# Patient Record
Sex: FEMALE | Race: WHITE
[De-identification: names, ages, dates, MRNs, and addresses within clinical notes are randomized per-mention and may not be internally consistent; named-entity substitution may affect disease eponyms.]

---

## 2017-07-02 ENCOUNTER — HOSPITAL ENCOUNTER (EMERGENCY)
Dept: HOSPITAL 45 - C.EDB | Age: 67
Discharge: HOME | End: 2017-07-02
Payer: COMMERCIAL

## 2017-07-02 VITALS
BODY MASS INDEX: 36.6 KG/M2 | BODY MASS INDEX: 36.6 KG/M2 | WEIGHT: 214.4 LBS | HEIGHT: 64.02 IN | WEIGHT: 214.4 LBS | HEIGHT: 64.02 IN

## 2017-07-02 VITALS — SYSTOLIC BLOOD PRESSURE: 124 MMHG | DIASTOLIC BLOOD PRESSURE: 83 MMHG | HEART RATE: 68 BPM | OXYGEN SATURATION: 91 %

## 2017-07-02 VITALS — TEMPERATURE: 98.42 F

## 2017-07-02 DIAGNOSIS — K21.9: ICD-10-CM

## 2017-07-02 DIAGNOSIS — E78.00: ICD-10-CM

## 2017-07-02 DIAGNOSIS — Z79.899: ICD-10-CM

## 2017-07-02 DIAGNOSIS — S82.852A: Primary | ICD-10-CM

## 2017-07-02 DIAGNOSIS — M19.90: ICD-10-CM

## 2017-07-02 DIAGNOSIS — E78.5: ICD-10-CM

## 2017-07-02 DIAGNOSIS — I10: ICD-10-CM

## 2017-07-02 DIAGNOSIS — E03.9: ICD-10-CM
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
## 2017-07-02 DIAGNOSIS — Z79.82: ICD-10-CM

## 2017-07-02 DIAGNOSIS — J32.9: ICD-10-CM

## 2017-07-02 LAB
ANION GAP SERPL CALC-SCNC: 9 MMOL/L (ref 3–11)
BASOPHILS # BLD: 0.04 K/UL (ref 0–0.2)
BASOPHILS NFR BLD: 0.7 %
BUN SERPL-MCNC: 23 MG/DL (ref 7–18)
BUN/CREAT SERPL: 22.7 (ref 10–20)
CALCIUM SERPL-MCNC: 10.4 MG/DL (ref 8.5–10.1)
CHLORIDE SERPL-SCNC: 106 MMOL/L (ref 98–107)
CO2 SERPL-SCNC: 26 MMOL/L (ref 21–32)
COMPLETE: YES
CREAT CL PREDICTED SERPL C-G-VRATE: 62.7 ML/MIN
CREAT SERPL-MCNC: 1 MG/DL (ref 0.6–1.2)
EOSINOPHIL NFR BLD AUTO: 216 K/UL (ref 130–400)
GLUCOSE SERPL-MCNC: 94 MG/DL (ref 70–99)
HCT VFR BLD CALC: 44.2 % (ref 37–47)
IG%: 0.2 %
IMM GRANULOCYTES NFR BLD AUTO: 17.6 %
LYMPHOCYTES # BLD: 1.04 K/UL (ref 1.2–3.4)
MCH RBC QN AUTO: 30.2 PG (ref 25–34)
MCHC RBC AUTO-ENTMCNC: 33.7 G/DL (ref 32–36)
MCV RBC AUTO: 89.7 FL (ref 80–100)
MONOCYTES NFR BLD: 10.3 %
NEUTROPHILS # BLD AUTO: 1.9 %
NEUTROPHILS NFR BLD AUTO: 69.3 %
PMV BLD AUTO: 10.8 FL (ref 7.4–10.4)
POTASSIUM SERPL-SCNC: 4 MMOL/L (ref 3.5–5.1)
RBC # BLD AUTO: 4.93 M/UL (ref 4.2–5.4)
SODIUM SERPL-SCNC: 141 MMOL/L (ref 136–145)
WBC # BLD AUTO: 5.9 K/UL (ref 4.8–10.8)

## 2017-07-02 NOTE — DIAGNOSTIC IMAGING REPORT
LEFT ANKLE 3 VIEWS



CLINICAL HISTORY: Postreduction examination. Ankle fracture.



FINDINGS: 3 views of the left ankle are compared to study performed earlier the

same day 7/2/2017. The examination is performed through a splint, obscuring fine

bony detail. The skeletal structures are osteopenic. Again seen is a distracted

avulsion fracture of the medial malleolus and a minimally distracted fracture

distal fibula. A vertically oriented fracture is again seen through the

posterior tibial plafond, and there is has been modest improvement in alignment

status post reduction. There is approximately 1.4 cm of persistent posterior

dislocation of the talus at the tibiotalar joint. A large joint effusion is

identified and significant soft tissue edema is present around ankle. There is a

large dorsal calcaneal enthesophyte. Degenerative spurring is noted from the

anterior tibial plafond. Degenerative spurring is also seen along the dorsal

aspect of the tarsal bones.



IMPRESSION: Modest improvement in alignment of a trimalleolar left ankle

fracture status post external reduction.







Electronically signed by:  Garrick Bettencourt M.D.

7/2/2017 11:59 AM



Dictated Date/Time:  7/2/2017 11:57 AM

## 2017-07-02 NOTE — DIAGNOSTIC IMAGING REPORT
LEFT ANKLE 3 VIEWS



CLINICAL HISTORY: Postreduction examination. Left ankle fractures.



FINDINGS: AP and crosstable lateral portable views of the left ankle are

compared to studies performed earlier the same day 7/2/2017. The skeletal

structures are osteopenic. Again seen is a trimalleolar fracture of the left

ankle, with an avulsion fracture of the medial malleolus an oblique fracture

through the distal fibula. Again seen is a vertically oriented and comminuted

fracture through the posterior tibial plafond. There is approximately 9 mm of

posterior distraction of the largest fragments. There has been restoration of

near-anatomic alignment at the tibiotalar joint as compared to previous. A large

joint effusion is identified and significant soft tissue edema is present around

ankle. There is a large dorsal calcaneal enthesophyte. Degenerative spurring is

noted from the anterior tibial plafond. Degenerative spurring is also seen along

the dorsal aspect of the tarsal bones.



IMPRESSION: Again seen is a trimalleolar left ankle fracture. There has been

significant improvement in alignment as compared to previous with restoration of

near-anatomic alignment at the tibiotalar joint.







Electronically signed by:  Garrick Bettencourt M.D.

7/2/2017 1:03 PM



Dictated Date/Time:  7/2/2017 1:00 PM

## 2017-07-02 NOTE — DIAGNOSTIC IMAGING REPORT
LEFT ANKLE 3 VIEWS



CLINICAL HISTORY: Left ankle pain.



FINDINGS: 3 views of the left ankle are obtained. No prior studies are available

for comparison at the time of dictation. The skeletal structures are osteopenic.

There is a distracted avulsion fracture of the medial malleolus. A vertically

oriented fracture is seen through the posterior tibial plafond, and there is

approximately 2 cm of posterior dislocation of the talus at the tibiotalar

joint. There is an oblique and distracted fracture of the distal fibula. A large

joint effusion is identified and significant soft tissue edema is present around

ankle. There is a large dorsal calcaneal enthesophyte. Degenerative spurring is

noted from the anterior tibial plafond. Degenerative spurring is also seen along

the dorsal aspect of the tarsal bones.



IMPRESSION: Trimalleolar left ankle fracture and dislocation as above with

associated joint effusion and soft tissue edema.







Electronically signed by:  Garrick Bettencourt M.D.

7/2/2017 10:40 AM



Dictated Date/Time:  7/2/2017 10:37 AM

## 2017-07-06 ENCOUNTER — HOSPITAL ENCOUNTER (OUTPATIENT)
Dept: HOSPITAL 45 - C.CPL | Age: 67
Discharge: HOME | End: 2017-07-06
Attending: PHYSICIAN ASSISTANT
Payer: COMMERCIAL
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
## 2017-07-06 DIAGNOSIS — S82.852A: Primary | ICD-10-CM

## 2017-07-06 DIAGNOSIS — Z01.818: ICD-10-CM

## 2017-07-06 NOTE — DIAGNOSTIC IMAGING REPORT
CT LEFT ANKLE NO CONTRAST



CT DOSE:    



CLINICAL HISTORY: S82.899A Z01.818 trimalleolar fracture dislocation



TECHNIQUE: Helical images were acquired in the transverse plane. Sagittal and

coronal reformatted images were acquired.



COMPARISON STUDY:  Conventional radiographic study dated 7/2/2017



FINDINGS: There is diffuse soft tissue edema. There is a comminuted oblique

fracture of the distal fibula. The distal fragment is posteriorly displaced x 10

mm.



There is a comminuted fracture of the posterior malleolus of the distal tibia.

The major component of the tibia is anterior subluxed with respect to the talus

x 11 mm.



There is a comminuted fracture of the medial malleolus. Major fragments are

distracted x 6 mm.



There is diffuse soft tissue edema.



No tendon or fractures are visualized. The subtalar joint appears intact.



There is a prominent Achilles insertional spur.



IMPRESSION:  Trimalleolar fracture subluxation. 







Electronically signed by:  Dex Mccormick M.D.

7/6/2017 4:09 PM



Dictated Date/Time:  7/6/2017 4:04 PM

## 2017-07-10 ENCOUNTER — HOSPITAL ENCOUNTER (OUTPATIENT)
Dept: HOSPITAL 45 - X.SURG | Age: 67
Discharge: HOME | End: 2017-07-10
Attending: ORTHOPAEDIC SURGERY
Payer: COMMERCIAL

## 2017-07-10 VITALS
WEIGHT: 214.44 LBS | HEIGHT: 64.02 IN | HEIGHT: 64.02 IN | WEIGHT: 214.44 LBS | BODY MASS INDEX: 36.61 KG/M2 | BODY MASS INDEX: 36.61 KG/M2

## 2017-07-10 VITALS — DIASTOLIC BLOOD PRESSURE: 85 MMHG | OXYGEN SATURATION: 94 % | SYSTOLIC BLOOD PRESSURE: 121 MMHG | HEART RATE: 83 BPM

## 2017-07-10 VITALS — TEMPERATURE: 97.7 F

## 2017-07-10 DIAGNOSIS — Z90.710: ICD-10-CM

## 2017-07-10 DIAGNOSIS — Z90.89: ICD-10-CM

## 2017-07-10 DIAGNOSIS — M19.90: ICD-10-CM

## 2017-07-10 DIAGNOSIS — Z90.49: ICD-10-CM

## 2017-07-10 DIAGNOSIS — S82.852A: Primary | ICD-10-CM

## 2017-07-10 DIAGNOSIS — E03.9: ICD-10-CM

## 2017-07-10 DIAGNOSIS — M06.9: ICD-10-CM

## 2017-07-10 DIAGNOSIS — E78.5: ICD-10-CM

## 2017-07-10 DIAGNOSIS — Z79.82: ICD-10-CM
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
## 2017-07-10 DIAGNOSIS — E66.9: ICD-10-CM

## 2017-07-10 DIAGNOSIS — I10: ICD-10-CM

## 2017-07-10 DIAGNOSIS — K21.9: ICD-10-CM

## 2017-07-10 NOTE — [facility] OPERATIVE REPORT
Operative Report


Operative Date


Jul 10, 2017.





Pre-Operative Diagnosis





Left Ankle Trimalleolar Fracture





Post-Operative Diagnosis





Same





Procedure(s) Performed





Left Trimalleolar Ankle Fracture Open Reduction Internal Fixation





Surgeon


Dr. TACHO Paez





Assistant Surgeon(s)


BRADEN Stone PA-C (No fellow avail)





Estimated Blood Loss


25 ML





Findings


Comminuted, displaced trimalleolar left ankle fracture, with soft bone.





Fluids (cc crystalloids)


1800





Specimens





None





Drains


n/a





Anesthesia


LMA + Popliteal block





Complication(s)


None





Disposition


Recovery Room / PACU (Stable)





Implants


Lateral Mal:


1) 5-Hole Distal Fibular Locking Plate (Arthrex).


2) 2.7 mm Locking Screws (12, 14, 16, & 18 mm).


3) 3.5 mm Locking Screws (14 mm x 3).


4) 3.5 mm Cortical Screw (22 mm).





Posterior Mal:


1) 4.0 mm Cannulated Screws (50 x 2 mm).





Medial Mal:


1) 3-Hole Medial Hook Plate (Arthrex).


2) 4.0 mm Cannulated Screw (44 mm).


3) 3.5 mm Cortical Screw (34 mm).


4) 3.5 mm Locking (24 mm).





Indications


The patient is a 67 year old female who injured their left ankle after a fall, 

sustaining a displaced bimalleolar ankle fracture.  The patient understands the 

risks of surgery, which include but are not limited to: bleeding, infection, re-

operation, damage to nerves and arteries, continued pain, loss of reduction, 

hardware failure, the need for repeat surgery, decrease level of activity, and 

DVT.  The patient understand all of these instructions and explanations, all of 

their questions have been satisfactorily addressed.  The patient have elected 

to proceed with surgery and the informed consent was signed.





Description of Procedure


The patient was taken to the Operating Room and placed in the supine position 

on the operating table.  After general anesthetic was administered a 

multidisciplinary time-out was performed identifying my initials on the left 

limb as the correct and operative limb.  Prior to the incision being made, 600 

milligrams of intravenous Ancef were given.  The left leg was prepped and 

draped in the standard fashion.





The distal fibula was marked as well as the planned incision centered about the 

distal fibula 8 cm in length.  In addition the planned medial incision 

approximately 5 cm in length and 3 cm anterior incision was also marked.  The 

planned incisions were injected with a 50:50 mixture of 1% lidocaine and 0.5 % 

Marcaine with epi for a total of 16 cc.  





The fibula was approached first.  The planned incision was made and carried 

down to the fibula.  The fracture site was easily identified as there was a 

tear in the fascia.  The Superficial Peroneal nerve was identified 

approximately 7cm proximal to the tip of fibula and was dissected and protected 

throughout the case.  The fracture site was debrided with copious irrigation, 

dental pick, and rongeur, removing any soft tissue and hematoma.  Using a a 

pointed reduction clamp the fracture was reduced.  A single lag screw was 

placed in the standard fashion and the clamp was able to be removed.  Her bone 

was soft.  The posterior malleolar fragment had improved alignment.  I was 

unable to place a clamp to further assist in the reduction without having the 5-

hole distal fibular locking plate fit the distal fibula and held in place by K 

wires through locking guides.  3 proximal locking screws were placed through 

the plate and locking screws were placed in the distal holes.   





Next our attention was drawn to the posterior malleolar fracture and with the 

distal fibula reduced a pointed reduction clamp was placed along the posterior 

malleolar fragment and a small anterior skin incision was made and care was 

taken to protect any neurovascular structures for the clamp to further aid in 

the reduction of the posterior malleolus.  The incision needed to be extended 

slightly in order to place the guidewire for the cannulated screw.  After the 

first screw had been placed a second guidewire was placed with a parallel 

aiming guide slightly distal and more lateral.  Both screws were placed in the 

standard fashion with noted improved alignment of the posterior comminuted 

malleolus fracture.  





Finally our attention was drawn to the medial malleoli or fracture.  The 

initial incision needed to be extended proximally after evaluating the 

comminuted medial malleoli or fracture and determining that to cannulated 

screws would not be able to be placed.  The fracture site was debrided with 

copious irrigation, dental pick, and rongeur, removing any soft tissue and 

hematoma.  Using a a pointed reduction clamp the sleeve of posterior fracture 

fragment was reduced and used to help guide the reduction of the tip of the 

medial malleolus.  The tip of the medial malleolus was held reduced with dental 

pick and a guidewire was placed.  A hook plate was desired to maintain the 

reduction, but in order to achieve good compression at the fracture site the 

cannulated screw was placed to suck the plate more proximally.  Again her bone 

was soft.  A nonlocking screw was placed proximally to suck the plate down to 

the bone followed by another locking screw proximally.


The wounds were copiously irrigated.  Final x-rays were obtained, showing near 

anatomic reduction of the comminuted trimalleolar ankle fracture.  The fascia 

over the plates was closed with 2-0 Vicryl and the subcutaneous layer were 

closed with 3-0 Vicryl. The skin was closed with staples. The sponge and needle 

counts were correct.  The wounds were covered with Xeroform, 4x4's, ABD's, 

Steril cast padding, and an AO splint was placed.  The patient was awakened and 

taken to the recovery room in stable condition.





Post-op Instructions:


The patient will remain [facility] for 4-6 weeks. Pain medicine prescription was given 

pre-operatively to be taken as needed.  The patient will follow up with me in 10

-15 days.


I attest to the content of the Intraoperative Record and any orders documented 

therein.  Any exceptions are noted below.

## 2017-07-10 NOTE — DISCHARGE INSTRUCTIONS-[facility]
Discharge Instructions


Date of Service


Jul 10, 2017.





Visit


Reason for Visit:  Left Ankle Fracture





Discharge


Discharge Diagnosis / Problem:  Status post ORIF left ankle fracture





Discharge Goals


Goal(s):  Decrease discomfort, Improve function, Increase independence





Activity Recommendations


Activity Limitations:  per Instructions/Follow-up section


Lifting Limitations:  none


Exercise/Sports Limitations:  none


May Resume Sexual Activity:  when tolerated


Shower/Bathe:  may shower/bathe in 3 days


Driving or Machine Use:  Not while on narcotics or splinted/boot





Anesthesia


.





Post Anesthesia Instructions:





If you have had General Anesthesia or IV Sedation:





*  Do not drive today.


*  Resume driving when surgeon permits.


*  Do not make important decisions or sign legal documents today.


*  Call surgeon for:





   1.  Temperature elevations greater than 101 degrees F.


   2.  Uncontrollable pain.


   3.  Excessive bleeding.


   4.  Persistent nausea and vomiting.


   5.  Medication intolerance (nausea, vomiting or rash).





*  For nausea and vomiting use only clear liquids such as: tea, soda, bouillon 

until nausea subsides, then gradually increase diet as tolerated.





*  If you have any concerns or questions, call your surgeon's office.  If 

physician is unavailable and it is an emergency, call 911 or go to the nearest 

emergency room.





.





Instructions / Follow-Up


Instructions / Follow-Up


Dr. Paez in 10-15 days.


Gonzalez Stone within 1 week.





Diet Recommendations


Home Diet:  resume previous diet





Procedures


Procedures Performed:  


Left Ankle Fracture Open Reduction Internal Fixation





Pending Studies


Studies pending at discharge:  no





Medical Emergencies








.


Who to Call and When:





Medical Emergencies:  If at any time you feel your situation is an emergency, 

please call 911 immediately.





.





Non-Emergent Contact


Non-Emergency issues call your:  Surgeon


Call Non-Emergent contact if:  temperature is above 101.5, your pain is not 

controlled, wound has increased drainage, wound has increased redness





.


.








"Provider Documentation" section prepared by Kimani Paez.








.

## 2017-07-10 NOTE — ANESTHESIA PROGRESS NT - [facility]
Anesthesia Post Op Note


Date & Time


Jul 10, 2017 at 17:13





Vital Signs


Pain Intensity:  0





Vital Signs Past 12 Hours








  Date Time  Temp Pulse Resp B/P (MAP) Pulse Ox O2 Delivery O2 Flow Rate FiO2


 


7/10/17 16:59 36.5 85 16 119/64 (82) 94 Room Air  


 


7/10/17 16:56 36.5   156/98    


 


7/10/17 16:53  86 17  92   


 


7/10/17 16:53  86 17     


 


7/10/17 16:51    123/81    


 


7/10/17 16:48  88 17  90   


 


7/10/17 16:48  88 17     


 


7/10/17 16:46    152/89    


 


7/10/17 16:43  87 18     


 


7/10/17 16:43  87 18  94   


 


7/10/17 16:41    138/87    


 


7/10/17 16:38  86 17  96   


 


7/10/17 16:38  87 17     


 


7/10/17 16:36    121/79    


 


7/10/17 16:33  88 20     


 


7/10/17 16:33  88 20  98   


 


7/10/17 16:31    127/80    


 


7/10/17 16:28  90 23     


 


7/10/17 16:28  90 23  97   


 


7/10/17 16:26    147/84    


 


7/10/17 16:23  93      


 


7/10/17 16:23  93   95   


 


7/10/17 16:21    136/78    


 


7/10/17 16:20    130/79    


 


7/10/17 16:19 36.2 89 20 136/78 97 Mask 6 


 


7/10/17 12:58   0     


 


7/10/17 12:57   15     


 


7/10/17 12:56    145/84    


 


7/10/17 12:52  72 25  100   


 


7/10/17 12:52  73      


 


7/10/17 12:51    162/94    


 


7/10/17 12:49  70 32  99   


 


7/10/17 12:49  70      


 


7/10/17 12:46    141/86    


 


7/10/17 10:46 36.9 68 20 146/85 (105) 92 Room Air  











Notes


Mental Status:  alert / awake / arousable, participated in evaluation


Pt Amnestic to Procedure:  Yes


Nausea / Vomiting:  adequately controlled


Pain:  adequately controlled


Airway Patency, RR, SpO2:  stable & adequate


BP & HR:  stable & adequate


Hydration State:  stable & adequate


Anesthetic Complications:  no major complications apparent

## 2017-07-10 NOTE — [facility] OPERATIVE REPORT
Operative Report


Operative Date


Jul 10, 2017.





Pre-Operative Diagnosis





Left Ankle Trimalleolar Fracture





Post-Operative Diagnosis





Same





Procedure(s) Performed





Left Trimalleolar Ankle Fracture Open Reduction Internal Fixation





Surgeon


Dr. TACHO Paez





Assistant Surgeon(s)


BRADEN Stone PA-C (No fellow avail)





Estimated Blood Loss


25 ML





Findings


same





Fluids (cc crystalloids)


1800





Specimens





None





Drains


none





Anesthesia


general, block





Complication(s)


None





Disposition


Recovery Room / PACU





Implants


see Dr. Paez's note





Indications


sustained injury to left ankle, xrays obtained, surgery recommended, consents 

signed





Description of Procedure


taken to the OR, prepped and draped, I was present the entire case, please see 

Dr. Paez's op note for further detail.


I attest to the content of the Intraoperative Record and any orders documented 

therein.  Any exceptions are noted below.

## 2017-07-10 NOTE — [facility] POST OPERATIVE BRIEF NOTE
Immediate Operative Summary


Operative Date


Jul 10, 2017.





Pre-Operative Diagnosis





Left Ankle Fracture





Post-Operative Diagnosis





Same





Procedure(s) Performed





Left Ankle Fracture Open Reduction Internal Fixation





Surgeon


Dr. TACHO Paez





Assistant Surgeon(s)


BRADEN Stone PA-C (No fellow avail)





Estimated Blood Loss


25 ML





Findings


Comminuted, displaced trimalleolar left ankle fracture, with soft bone.





Fluids (cc crystalloids)


1800





Specimens





None





Drains


n/a





Anesthesia


LMA + popliteal block





Complication(s)


None





Disposition


Recovery Room / PACU (Stable)

## 2017-07-18 ENCOUNTER — HOSPITAL ENCOUNTER (INPATIENT)
Dept: HOSPITAL 45 - C.EDC | Age: 67
LOS: 6 days | Discharge: HOME | DRG: 175 | End: 2017-07-24
Attending: HOSPITALIST | Admitting: HOSPITALIST
Payer: COMMERCIAL

## 2017-07-18 VITALS
WEIGHT: 215.61 LBS | HEIGHT: 64.5 IN | HEIGHT: 64.5 IN | WEIGHT: 215.61 LBS | BODY MASS INDEX: 36.36 KG/M2 | BODY MASS INDEX: 36.36 KG/M2

## 2017-07-18 DIAGNOSIS — I82.492: ICD-10-CM

## 2017-07-18 DIAGNOSIS — Z98.890: ICD-10-CM

## 2017-07-18 DIAGNOSIS — E03.9: ICD-10-CM

## 2017-07-18 DIAGNOSIS — E78.5: ICD-10-CM

## 2017-07-18 DIAGNOSIS — Z79.899: ICD-10-CM

## 2017-07-18 DIAGNOSIS — I10: ICD-10-CM

## 2017-07-18 DIAGNOSIS — J96.01: ICD-10-CM

## 2017-07-18 DIAGNOSIS — Z79.82: ICD-10-CM

## 2017-07-18 DIAGNOSIS — K21.9: ICD-10-CM

## 2017-07-18 DIAGNOSIS — F17.200: ICD-10-CM

## 2017-07-18 DIAGNOSIS — I82.432: ICD-10-CM

## 2017-07-18 DIAGNOSIS — I26.99: Primary | ICD-10-CM

## 2017-07-19 VITALS
OXYGEN SATURATION: 96 % | SYSTOLIC BLOOD PRESSURE: 118 MMHG | TEMPERATURE: 97.7 F | DIASTOLIC BLOOD PRESSURE: 73 MMHG | HEART RATE: 57 BPM

## 2017-07-19 VITALS
SYSTOLIC BLOOD PRESSURE: 129 MMHG | OXYGEN SATURATION: 92 % | TEMPERATURE: 97.88 F | DIASTOLIC BLOOD PRESSURE: 78 MMHG | HEART RATE: 64 BPM

## 2017-07-19 VITALS
DIASTOLIC BLOOD PRESSURE: 78 MMHG | OXYGEN SATURATION: 94 % | HEART RATE: 87 BPM | SYSTOLIC BLOOD PRESSURE: 157 MMHG | TEMPERATURE: 97.7 F

## 2017-07-19 VITALS
HEART RATE: 64 BPM | DIASTOLIC BLOOD PRESSURE: 63 MMHG | OXYGEN SATURATION: 94 % | SYSTOLIC BLOOD PRESSURE: 121 MMHG | TEMPERATURE: 98.06 F

## 2017-07-19 VITALS
HEART RATE: 58 BPM | DIASTOLIC BLOOD PRESSURE: 74 MMHG | SYSTOLIC BLOOD PRESSURE: 124 MMHG | OXYGEN SATURATION: 94 % | TEMPERATURE: 98.06 F

## 2017-07-19 VITALS — OXYGEN SATURATION: 92 %

## 2017-07-19 VITALS
SYSTOLIC BLOOD PRESSURE: 122 MMHG | HEART RATE: 64 BPM | OXYGEN SATURATION: 94 % | DIASTOLIC BLOOD PRESSURE: 76 MMHG | TEMPERATURE: 98.6 F

## 2017-07-19 LAB
ALP SERPL-CCNC: 68 U/L (ref 45–117)
ALT SERPL-CCNC: 31 U/L (ref 12–78)
ANION GAP SERPL CALC-SCNC: 7 MMOL/L (ref 3–11)
ANION GAP SERPL CALC-SCNC: 8 MMOL/L (ref 3–11)
ARTERIAL PATENCY WRIST A: (no result)
AST SERPL-CCNC: 24 U/L (ref 15–37)
BASE EXCESS BLDA CALC-SCNC: -2.7 MEQ/L (ref -9–1.8)
BASOPHILS # BLD: 0.02 K/UL (ref 0–0.2)
BASOPHILS # BLD: 0.04 K/UL (ref 0–0.2)
BASOPHILS NFR BLD: 0.3 %
BASOPHILS NFR BLD: 0.6 %
BUN SERPL-MCNC: 21 MG/DL (ref 7–18)
BUN SERPL-MCNC: 22 MG/DL (ref 7–18)
BUN/CREAT SERPL: 17.1 (ref 10–20)
BUN/CREAT SERPL: 18.3 (ref 10–20)
CALCIUM SERPL-MCNC: 10.5 MG/DL (ref 8.5–10.1)
CALCIUM SERPL-MCNC: 9 MG/DL (ref 8.5–10.1)
CHLORIDE SERPL-SCNC: 108 MMOL/L (ref 98–107)
CHLORIDE SERPL-SCNC: 109 MMOL/L (ref 98–107)
CKMB/CK RATIO: (no result) (ref 0–3)
CO2 SERPL-SCNC: 27 MMOL/L (ref 21–32)
CO2 SERPL-SCNC: 27 MMOL/L (ref 21–32)
COMPLETE: YES
COMPLETE: YES
CREAT CL PREDICTED SERPL C-G-VRATE: 53.1 ML/MIN
CREAT CL PREDICTED SERPL C-G-VRATE: 53.3 ML/MIN
CREAT SERPL-MCNC: 1.2 MG/DL (ref 0.6–1.2)
CREAT SERPL-MCNC: 1.2 MG/DL (ref 0.6–1.2)
EOSINOPHIL NFR BLD AUTO: 276 K/UL (ref 130–400)
EOSINOPHIL NFR BLD AUTO: 315 K/UL (ref 130–400)
GLUCOSE SERPL-MCNC: 102 MG/DL (ref 70–99)
GLUCOSE SERPL-MCNC: 103 MG/DL (ref 70–99)
HCO3 BLDA-SCNC: 21 MMOL/L (ref 19–24)
HCT VFR BLD CALC: 38 % (ref 37–47)
HCT VFR BLD CALC: 42.6 % (ref 37–47)
IG%: 0.3 %
IG%: 0.4 %
IMM GRANULOCYTES NFR BLD AUTO: 17.2 %
IMM GRANULOCYTES NFR BLD AUTO: 19.9 %
INR PPP: 1 (ref 0.9–1.1)
LYMPHOCYTES # BLD: 1.35 K/UL (ref 1.2–3.4)
LYMPHOCYTES # BLD: 1.44 K/UL (ref 1.2–3.4)
MCH RBC QN AUTO: 28.5 PG (ref 25–34)
MCH RBC QN AUTO: 29.5 PG (ref 25–34)
MCHC RBC AUTO-ENTMCNC: 31.6 G/DL (ref 32–36)
MCHC RBC AUTO-ENTMCNC: 33.3 G/DL (ref 32–36)
MCV RBC AUTO: 88.6 FL (ref 80–100)
MCV RBC AUTO: 90.3 FL (ref 80–100)
MONOCYTES NFR BLD: 8 %
MONOCYTES NFR BLD: 8.7 %
NEUTROPHILS # BLD AUTO: 2.1 %
NEUTROPHILS # BLD AUTO: 3.6 %
NEUTROPHILS NFR BLD AUTO: 68.3 %
NEUTROPHILS NFR BLD AUTO: 70.6 %
O2 ADMINISTRATION: (no result)
PARTIAL THROMBOPLASTIN RATIO: 0.9
PARTIAL THROMBOPLASTIN RATIO: 1.9
PMV BLD AUTO: 10.6 FL (ref 7.4–10.4)
PMV BLD AUTO: 10.7 FL (ref 7.4–10.4)
PO2 BLDA: 71 MM/HG (ref 80–95)
POTASSIUM SERPL-SCNC: 3.7 MMOL/L (ref 3.5–5.1)
POTASSIUM SERPL-SCNC: 4.2 MMOL/L (ref 3.5–5.1)
PROTHROMBIN TIME: 11 SECONDS (ref 9–12)
RBC # BLD AUTO: 4.21 M/UL (ref 4.2–5.4)
RBC # BLD AUTO: 4.81 M/UL (ref 4.2–5.4)
SAO2 % BLDA: 93.5 % (ref 90–95)
SODIUM SERPL-SCNC: 143 MMOL/L (ref 136–145)
SODIUM SERPL-SCNC: 143 MMOL/L (ref 136–145)
WBC # BLD AUTO: 7.22 K/UL (ref 4.8–10.8)
WBC # BLD AUTO: 7.84 K/UL (ref 4.8–10.8)

## 2017-07-19 RX ADMIN — RANITIDINE SCH MG: 150 TABLET ORAL at 08:09

## 2017-07-19 RX ADMIN — HEPARIN SODIUM PRN MLS/HR: 1000 INJECTION, SOLUTION INTRAVENOUS; SUBCUTANEOUS at 21:45

## 2017-07-19 RX ADMIN — LEVOTHYROXINE SODIUM SCH MCG: 112 TABLET ORAL at 08:09

## 2017-07-19 RX ADMIN — ACETAMINOPHEN PRN MG: 325 TABLET ORAL at 06:40

## 2017-07-19 RX ADMIN — FENOFIBRATE SCH MG: 145 TABLET, FILM COATED ORAL at 08:09

## 2017-07-19 RX ADMIN — ACETAMINOPHEN PRN MG: 325 TABLET ORAL at 14:03

## 2017-07-19 RX ADMIN — RANITIDINE SCH MG: 150 TABLET ORAL at 19:28

## 2017-07-19 RX ADMIN — ACETAMINOPHEN PRN MG: 325 TABLET ORAL at 19:28

## 2017-07-19 RX ADMIN — HEPARIN SODIUM PRN MLS/HR: 1000 INJECTION, SOLUTION INTRAVENOUS; SUBCUTANEOUS at 03:36

## 2017-07-19 NOTE — DIAGNOSTIC IMAGING REPORT
CHEST ONE VIEW PORTABLE



CLINICAL HISTORY: 67 years-old Female presenting with CHEST PAIN. 



TECHNIQUE: Portable upright AP view of the chest was obtained.



COMPARISON:  CT performed the same day and CT from 2012.



FINDINGS:

Cardiomediastinal silhouette normal. Minimal left apical opacity is better

appreciated on subsequent CT. Pleural spaces clear. Osseous structures and upper

abdomen normal.



IMPRESSION:

1.  Minimal left apical opacity better appreciated on subsequent CT.







Electronically signed by:  Mitchell Bradshaw M.D.

7/19/2017 7:02 AM



Dictated Date/Time:  7/19/2017 7:00 AM

## 2017-07-19 NOTE — EMERGENCY ROOM VISIT NOTE
History


Report prepared by Rashid:  Hermila Ortega


Under the Supervision of:  Dr. Dequan Edwards M.D.


First contact with patient:  23:51


Chief Complaint:  CHEST PAIN


Stated Complaint:  CHEST PAIN


Nursing Triage Summary:  


Pt arrived via ambulance ALS.  Pt stated to EMS that she started to experience 


chest tightness and shortness of breath around noon today. The tightness is 


substernal and wraps around her chest to back.  Pt thought that she might have 


pulled a muscle so she used bengay on the area.  Pt stated that she went to bed 


and her tightness in the chest got worse.  Pt stated she became diaphoretic.  

Pt 


denies nausea.  Pt had recent surgery on her left ankle.  When EMS arrived the 


pt pulse ox was low 90's.  EMS gave the pt 324mg Aspirin and 2 Nitro.  The pts 


pain was originally 6 out of 10.  After the second nitro was given it came down 


to a 3 out of 10.





History of Present Illness


The patient is a 67 year old female who presents to the Emergency Room with 

complaints of worsening chest pain that began today.  She currently rates her 

discomfort as a 3/10 in severity.  The patient states that she just recently 

had surgery and has not been moving as frequently.  She states that she has 

been placed on aspirin twice per day since her surgery.  The patient states 

that her pain wraps around to her back.  She additionally notes that she has 

become short of breath with the pain.  The patient reports a previous stress 

test, but denies any previous cardiac catheterization or ever seeing a 

cardiologist in the back.  She states that the pain has progressively worsened 

since noon.  The patient describes her pain as a tightness and pressure.





   Source of History:  patient


   Onset:  today


   Position:  chest


   Symptom Intensity:  3/10


   Quality:  pressure, other (tightness)


   Timing:  worsening


   Associated Symptoms:  + SOB, + back pain





Review of Systems


See HPI for pertinent positives & negatives. A total of 10 systems reviewed and 

were otherwise negative.





Past Medical & Surgical


Medical Problems:


(1) Benign hypertension


(2) Chronic Sinusitis Nos


(3) Esophageal Reflux


(4) Gastroesophageal reflux disease


(5) Hyperlipidemia Nec/Nos


(6) Hypertension Nos


(7) Hypothyroidism


(8) Respiratory failure, acute








Family History





Diabetes mellitus


Gallbladder disease





Social History


Smoking Status:  Never Smoker


Marital Status:  


Housing Status:  lives with significant other


Occupation Status:  employed





Current/Historical Medications


Scheduled


Aspirin Enteric Coated (Ecotrin Or Generic), 325 MG PO BID


Atenolol (Tenormin), 50 MG PO QAM


Calcium Carbonate-Vitamin D (Calcium 600+D3), 1 TAB PO BID


Fenofibrate (Tricor), 160 MG PO QAM


Fish Oil (Pontiac-3), 1 CAP PO BID


Hydroxyzine Hcl (Atarax), 25 MG PO QAM


Levothyroxine Sodium (Levothyroxine Sodium), 112 MCG PO QAM


Ranitidine (Zantac), 150 MG PO BID





Scheduled PRN


Acetaminophen (Tylenol), 1,000 MG PO Q6H PRN for Pain


Hydrocodone-Acetaminophen (Lortab 5-325 mg), 1 TAB PO Q4H PRN for Pain





Allergies


Coded Allergies:  


     Amoxicillin (Verified  Allergy, Intermediate, DIARRHEA, 7/18/17)


     Sulfamethoxazole w/Trimethoprim (Verified  Allergy, Intermediate, HEADACHES

, 7/18/17)


     Adhesives (Verified  Allergy, Unknown, BURNING RASH, 7/18/17)


     Codeine (Verified  Allergy, Unknown, HEADACHES OR NAUSEA -- UNSURE, 7/18/17

)


     Gemfibrozil (Verified  Allergy, Unknown, UNKNOWN, 7/18/17)


     Minocycline (Verified  Allergy, Unknown, ., 7/18/17)


     Nitrofurantoin (Verified  Allergy, Unknown, UNKNOWN, 7/18/17)


     Morphine (Verified  Adverse Reaction, Severe, abdominal and neck pain, 

nausea, flushed, 7/19/17)


     POLLEN (Verified  Adverse Reaction, Unknown, UNKNOWN, 7/18/17)


Uncoded Allergies:  


     MOLD (Adverse Reaction, Unknown, UNKNOWN, 10/13/14)





Physical Exam


Vital Signs











  Date Time  Temp Pulse Resp B/P (MAP) Pulse Ox O2 Delivery O2 Flow Rate FiO2


 


7/19/17 02:01    167/96    


 


7/19/17 01:31    162/86    


 


7/19/17 01:20  67 18  96   


 


7/19/17 01:18  71 18 164/81 95 Nasal Cannula 3.0 


 


7/19/17 01:15    164/81    


 


7/19/17 00:36  67 21  90   


 


7/19/17 00:31    161/89    


 


7/19/17 00:07     93 Nasal Cannula 2.0 


 


7/19/17 00:06     91 Room Air  


 


7/19/17 00:06  69 21  89   


 


7/19/17 00:06     89 Room Air  


 


7/19/17 00:01    157/86    


 


7/19/17 00:00  63 18  91   


 


7/18/17 23:31    158/90    


 


7/18/17 23:30  66 19  92   


 


7/18/17 23:25  66 18 159/86 91   


 


7/18/17 23:23     90 Room Air  


 


7/18/17 23:20    148/94    


 


7/18/17 23:13  64      


 


7/18/17 23:12 36.6 71 20 145/87 90 Room Air  


 


7/18/17 23:12     90 Room Air  











Physical Exam


GENERAL: Patient is a healthy-appearing well-nourished female


HEAD: Normocephalic atraumatic


EYES: Ocular movements intact pupils equal and react to light


OROPHARYNX mucous membranes are moist no exudates present no erythema or edema 

present


NECK: Supple no nuchal rigidity


CHEST: Good equal expansion


LUNGS: Clear and equal to auscultation


CARDIAC: Normal S1 and S2


ABDOMEN: Soft nontender no guarding


BACK: No CVA tenderness


EXTREMITIES: No pain upon palpation normal muscle strength in all groups no 

clubbing cyanosis or edema


NEURO: Patient is following commands and answering questions appropriately. 

Alert and oriented x3 Cranial Nerves 2-12 grossly intact





Medical Decision & Procedures


ER Provider


Diagnostic Interpretation:


1 view chest x-ray interpreted by me: no evidence of pneumonia, congestion, or 

pneumothorax.





Laboratory Results


7/18/17 22:41








Red Blood Count 4.81, Mean Corpuscular Volume 88.6, Mean Corpuscular Hemoglobin 

29.5, Mean Corpuscular Hemoglobin Concent 33.3, Mean Platelet Volume 10.6, 

Neutrophils (%) (Auto) 70.6, Lymphocytes (%) (Auto) 17.2, Monocytes (%) (Auto) 

8.0, Eosinophils (%) (Auto) 3.6, Basophils (%) (Auto) 0.3, Neutrophils # (Auto) 

5.54, Lymphocytes # (Auto) 1.35, Monocytes # (Auto) 0.63, Eosinophils # (Auto) 

0.28, Basophils # (Auto) 0.02





7/18/17 22:41

















Test


  7/18/17


22:41 7/18/17


23:41 7/19/17


01:19 7/19/17


01:41


 


White Blood Count


  7.84 K/uL


(4.8-10.8) 


  


  


 


 


Red Blood Count


  4.81 M/uL


(4.2-5.4) 


  


  


 


 


Hemoglobin


  14.2 g/dL


(12.0-16.0) 


  


  


 


 


Hematocrit 42.6 % (37-47)    


 


Mean Corpuscular Volume


  88.6 fL


() 


  


  


 


 


Mean Corpuscular Hemoglobin


  29.5 pg


(25-34) 


  


  


 


 


Mean Corpuscular Hemoglobin


Concent 33.3 g/dl


(32-36) 


  


  


 


 


Platelet Count


  315 K/uL


(130-400) 


  


  


 


 


Mean Platelet Volume


  10.6 fL


(7.4-10.4) 


  


  


 


 


Neutrophils (%) (Auto) 70.6 %    


 


Lymphocytes (%) (Auto) 17.2 %    


 


Monocytes (%) (Auto) 8.0 %    


 


Eosinophils (%) (Auto) 3.6 %    


 


Basophils (%) (Auto) 0.3 %    


 


Neutrophils # (Auto)


  5.54 K/uL


(1.4-6.5) 


  


  


 


 


Lymphocytes # (Auto)


  1.35 K/uL


(1.2-3.4) 


  


  


 


 


Monocytes # (Auto)


  0.63 K/uL


(0.11-0.59) 


  


  


 


 


Eosinophils # (Auto)


  0.28 K/uL


(0-0.5) 


  


  


 


 


Basophils # (Auto)


  0.02 K/uL


(0-0.2) 


  


  


 


 


RDW Standard Deviation


  39.8 fL


(36.4-46.3) 


  


  


 


 


RDW Coefficient of Variation


  12.5 %


(11.5-14.5) 


  


  


 


 


Immature Granulocyte % (Auto) 0.3 %    


 


Immature Granulocyte # (Auto)


  0.02 K/uL


(0.00-0.02) 


  


  


 


 


D-Dimer


  95808 ug/L FEU


(0-500) 


  


  


 


 


Anion Gap


  8.0 mmol/L


(3-11) 


  


  


 


 


Est Creatinine Clear Calc


Drug Dose 53.3 ml/min 


  


  


  


 


 


Estimated GFR (


American) 54.5 


  


  


  


 


 


Estimated GFR (Non-


American 47.1 


  


  


  


 


 


BUN/Creatinine Ratio 18.3 (10-20)    


 


Calcium Level


  10.5 mg/dl


(8.5-10.1) 


  


  


 


 


Total Bilirubin


  0.3 mg/dl


(0.2-1) 


  


  


 


 


Direct Bilirubin


  < 0.1 mg/dl


(0-0.2) 


  


  


 


 


Aspartate Amino Transf


(AST/SGOT) 24 U/L (15-37) 


  


  


  


 


 


Alanine Aminotransferase


(ALT/SGPT) 31 U/L (12-78) 


  


  


  


 


 


Alkaline Phosphatase


  68 U/L


() 


  


  


 


 


Total Creatine Kinase


  46 U/L


() 


  


  


 


 


Creatine Kinase MB


  < 0.5 ng/ml


(0.5-3.6) 


  


  


 


 


Creatine Kinase MB Ratio  (0-3.0)    


 


Troponin I


  0.023 ng/ml


(0-0.045) 


  


  


 


 


Total Protein


  7.6 gm/dl


(6.4-8.2) 


  


  


 


 


Albumin


  3.8 gm/dl


(3.4-5.0) 


  


  


 


 


Lipase


  268 U/L


() 


  


  


 


 


Prothrombin Time


  


  11.0 SECONDS


(9.0-12.0) 


  


 


 


Prothromb Time International


Ratio 


  1.0 (0.9-1.1) 


  


  


 


 


Activated Partial


Thromboplast Time 


  24.3 SECONDS


(21.0-31.0) 


  


 


 


Partial Thromboplastin Ratio  0.9   








Labs reviewed by ED physician.





Medications Administered











 Medications


  (Trade)  Dose


 Ordered  Sig/Mora


 Route  Start Time


 Stop Time Status Last Admin


Dose Admin


 


 Famotidine


  (Pepcid Tab)  20 mg  NOW  STAT


 PO  7/19/17 00:33


 7/19/17 00:35 DC 7/19/17 00:48


20 MG


 


 Sucralfate


  (Carafate Tab)  1 gm  NOW  STAT


 PO  7/19/17 00:33


 7/19/17 00:35 DC 7/19/17 00:48


1 GM


 


 Lidocaine HCl


  (Viscous


 Lidocaine 2% Soln)  20 ml  STK-MED ONCE


 .ROUTE  7/19/17 00:45


 7/19/17 00:46 DC 7/19/17 00:49


20 ML


 


 Al Hydroxide/Mg


 Hydroxide


  (Maalox Susp)  30 ml  STK-MED ONCE


 .ROUTE  7/19/17 00:45


 7/19/17 00:46 DC 7/19/17 00:49


30 ML


 


 Ondansetron HCl


  (Zofran Inj)  4 mg  NOW  STAT


 IV  7/19/17 01:30


 7/19/17 01:32 DC 7/19/17 01:47


4 MG


 


 Albuterol/


 Ipratropium


  (Duoneb)  12 ml  ONE  ONCE


 INH  7/19/17 01:30


 7/19/17 01:32 DC 7/19/17 02:09


12 ML


 


 Morphine Sulfate


  (MoRPHine


 SULFATE INJ)  2 mg  STK-MED ONCE


 .ROUTE  7/19/17 01:44


 7/19/17 01:45 DC 7/19/17 01:47


2 MG


 


 Morphine Sulfate


  (MoRPHine


 SULFATE INJ)  4 mg  STK-MED ONCE


 .ROUTE  7/19/17 01:44


 7/19/17 01:45 DC 7/19/17 01:51


4 MG


 


 Diphenhydramine


 HCl


  (Benadryl Inj)  50 mg  NOW  STAT


 IV  7/19/17 01:54


 7/19/17 01:55 DC 7/19/17 01:56


50 MG











ECG


Indication:  chest pain


Rate (beats per minute):  67


Rhythm:  normal sinus


Findings:  no acute ischemic change, no ectopy





ED Course


2356: Past medical records reviewed. The patient was evaluated in room C11B. A 

complete history and physical examination was performed. 





0033: Ordered Carafate Tab 1 gm PO, Pepcid Tab 20 mg PO, GI cocktail 24 ml PO.





Medical Decision


Differential diagnosis:


Etiologies such as cardiac ischemia, aortic dissection, pulmonary embolism, 

pneumonia, pneumothorax, musculoskeletal, infections, pericarditis, myocarditis

, esophageal rupture, gastrointestinal, as well as others were entertained. 





Medication Reconciliation: I attest that I have personally reviewed the patient'

s current medication list





Blood Pressure Screening: Patient was found to have an elevated blood pressure 

and was referred to their primary care doctor for recheck and further treatment





This is a 67-year-old female who presents emergency department complaining of 

chest heaviness.  I will note that the patient recently had surgery on her left 

lower extremity.  I am concerned about a blood clot therefore the patient was 

sent for a CAT scan of the chest.  She has a large pulmonary embolus.  

Hypercoagulability workup was obtained.  The patient is hypoxic here in the 

emergency department and requiring oxygen.  I therefore believe that the 

patient should be admitted.  PT INR PTT were also drawn.  I did discuss the 

case with the hospitalist service.





Impression





 Primary Impression:  


 Pulmonary embolism





Critical Care


I have personally spent greater than 30 minutes of critical care time in the 

direct management of this patient.  This includes bedside care, interpretation 

of diagnostic studies, and testing, discussion with consultants, patient, and 

family members, and other required patient management activities.  This 30 

minutes is in excess of all separately billable procedures.





Scribe Attestation


The scribe's documentation has been prepared under my direction and personally 

reviewed by me in its entirety. I confirm that the note above accurately 

reflects all work, treatment, procedures, and medical decision making performed 

by me.





Departure Information


Dispostion


Being Evaluated By Hospitalist





Referrals


Jacinta Perez (PCP)





Patient Instructions


[facility]





Problem Qualifiers








 Primary Impression:  


 Pulmonary embolism


 Pulmonary embolism type:  other  Chronicity:  acute  Acute cor pulmonale 

presence:  without acute cor pulmonale  Qualified Codes:  I26.99 - Other 

pulmonary embolism without acute cor pulmonale

## 2017-07-19 NOTE — PULMONARY CONSULTATION
History


General


Date of Service:


Jul 19, 2017.


Stated Complaint:


Respiratory Failure, Acute





HPI


The patient is a 67 year old female who presents to [facility] with complaints of Respiratory Failure, Acute. The patient's primary 

care provider is Jacinta Perez.








This patient is a 67-year-old female who presented to the ER with 1 day history 

of acute chest pain/epigastricand shortness of breath.  She is status post left 

ankle ORIF for trimalleolar fracture done on 07/10/2017.  She was prescribed 

aspirin 325 twice a day for DVT prophylaxis.  Vital she denies any fevers, 

chills, cough  or hemoptysis.  Vital signs afebrile 36.6C, pulse 71 beats per 

minute and respiratory rate 20 blood pressure 135/87 pulse 90% on room air.


A d-dimer was done and was 13,620.  Troponins 2 are negative.  CT chest showed 

bilateral pulmonary emboli with straightening of the intraventricular septum 

suspicious for right heart strain as well as focal groundglass opacity of left 

upper lobe. ABG showed pH of 7.41, PCO2 of 34, PaO2 of 71, bicarbonate of 21 

and saturating 93.5% on 2 L nasal cannula.  She was started on heparin for 

anticoagulation  and admitted for management of pulmonary embolism.


Since admission her vital signs have been stable and she has been saturating 

well on nasal cannula. 





A hypercoagulable workup has been sent.





Review of Systems


Cardiovascular:  reports: chest pain, chest pressure


Respiratory:  reports: as stated in HPI, shortness of breath, denies: cough, 

wheezing, sputum production, hemoptysis





All Other Symptoms


All Other Systems:  Reviewed and Negative





Past Medical History


Past Medical History:


Gastric esophageal reflux disease


Hyperlipidemia


Hypothyroidism


Past Surgical History:


Hysterectomy


Cholecystectomy


Recent orthopedic surgery





Family History





Diabetes mellitus


Gallbladder disease


Denies tobacco use, alcohol or illicit drug use





Social History


Denies tobacco use, alcohol or illicit drug use


Hx Tobacco Use In Past Year?:  No


Smoking Status:  Current Every Day Smoker


Alcohol:  no current use


Drug Use:  none


Marital status:  


Occupational Status:  employed





Immunizations


History of Influenza Vaccine:  Yes


History of Tetanus Vaccine?:  Yes


History of Pneumococcal:  No


History of Hepatitis B Vaccine:  No





History of MDRO


History of MDRO:  No





Allergies


Coded Allergies:  


     Amoxicillin (Verified  Allergy, Intermediate, DIARRHEA, 7/18/17)


     Sulfamethoxazole w/Trimethoprim (Verified  Allergy, Intermediate, HEADACHES

, 7/18/17)


     Adhesives (Verified  Allergy, Unknown, BURNING RASH, 7/18/17)


     Codeine (Verified  Allergy, Unknown, HEADACHES OR NAUSEA -- UNSURE, 7/18/17

)


     Gemfibrozil (Verified  Allergy, Unknown, UNKNOWN, 7/18/17)


     Minocycline (Verified  Allergy, Unknown, ., 7/18/17)


     Nitrofurantoin (Verified  Allergy, Unknown, UNKNOWN, 7/18/17)


     Morphine (Verified  Adverse Reaction, Severe, abdominal and neck pain, 

nausea, flushed, 7/19/17)


     POLLEN (Verified  Adverse Reaction, Unknown, UNKNOWN, 7/18/17)


Uncoded Allergies:  


     MOLD (Adverse Reaction, Unknown, UNKNOWN, 10/13/14)





Current Medications





Reported Home Medications








 Medications  Dose


 Route/Sig


 Max Daily Dose Days Date Category Dose


Instructions


 


 Lortab 5-325 mg


  (Hydrocodone-Acetaminophen)


 1 Tab Tab  1 Tab


 PO Q4H PRN


    7/18/17 Reported 


 


 Ecotrin Or


 Generic (Aspirin)


 325 Mg Ectab  325 Mg


 PO BID


    7/18/17 Reported  "SINCE SURGERY TO PREVENT BLOOD CLOTS".


 


 Tylenol


  (Acetaminophen)


 500 Mg Tab  1,000 Mg


 PO Q6H PRN


    7/6/17 Reported 


 


 Zantac


  (Ranitidine HCl)


 150 Mg Tab  150 Mg


 PO BID


    7/2/17 Reported 


 


 Tenormin


  (Atenolol) 50 Mg


 Tab  50 Mg


 PO QAM


    7/2/17 Reported 


 


 Calcium 600+D3


  (Calcium


 Carbonate-Vitamin


 D) 1 Tab Tab  1 Tab


 PO BID


    7/9/15 Reported 


 


 Levothyroxine


 Sodium 112 Mcg Tab  112 Mcg


 PO QAM


    7/9/15 Reported 


 


 Atarax


  (Hydroxyzine Hcl)


 25 Mg Tab  25 Mg


 PO QAM


    7/9/15 Reported 


 


 Omega-3 (Fish


 Oil) 1 Ea Cap  1 Cap


 PO BID


    3/7/12 Reported 


 


 Tricor


  (Fenofibrate) 160


 Mg Tab  160 Mg


 PO QAM


    4/25/09 Reported 











Physical


Physical Exam


Vital Signs:











  Date Time  Temp Pulse Resp B/P (MAP) Pulse Ox O2 Delivery O2 Flow Rate FiO2


 


7/19/17 12:00     92 Nasal Cannula 4.0 


 


7/19/17 11:59 36.6 64 16 129/78 (95) 92 Room Air  


 


7/19/17 08:11 36.7 64 16 121/63 (82) 94   


 


7/19/17 08:00      Nasal Cannula 4.0 


 


7/19/17 03:03 36.5 87 24 157/78 94 Nasal Cannula 3.0 


 


7/19/17 02:30 36.6 67 18 167/96 96   


 


7/19/17 02:01    167/96    


 


7/19/17 01:31    162/86    


 


7/19/17 01:20  67 18  96   


 


7/19/17 01:18  71 18 164/81 95 Nasal Cannula 3.0 


 


7/19/17 01:15    164/81    


 


7/19/17 00:36  67 21  90   


 


7/19/17 00:31    161/89    


 


7/19/17 00:07     93 Nasal Cannula 2.0 


 


7/19/17 00:06     91 Room Air  


 


7/19/17 00:06  69 21  89   


 


7/19/17 00:06     89 Room Air  


 


7/19/17 00:01    157/86    


 


7/19/17 00:00  63 18  91   


 


7/18/17 23:31    158/90    


 


7/18/17 23:30  66 19  92   


 


7/18/17 23:25  66 18 159/86 91   


 


7/18/17 23:23     90 Room Air  


 


7/18/17 23:20    148/94    


 


7/18/17 23:13  64      


 


7/18/17 23:12 36.6 71 20 145/87 90 Room Air  


 


7/18/17 23:12     90 Room Air  








Head:  normocephalic, atraumatic


Skin: No skin rashes 


Eyes:  normal inspection, EOMI, sclerae normal


ENT:  normal ENT inspection, pharynx normal


Neck:  supple, no adenopathy, trachea midline


Respiratory/Chest: Good air entry bilaterally, no crackles or wheezes


Cardiovascular:  regular rate, rhythm, no edema, no murmur


Abdomen / GI:  normal bowel sounds, non tender, obese 


Back: Intact 


Extremities: Left leg in cast, right lower extremity with compression stockings

, No cyanosis or clubbing bilaterally.


Neurologic/Psych:  no motor/sensory deficits, awake alert oriented 3





Diagnostics


Labs





Results Past 24 Hours








Test


  7/18/17


22:41 7/18/17


23:41 7/19/17


02:18 7/19/17


02:20 Range/Units


 


 


White Blood Count 7.84    4.8-10.8  K/uL


 


Red Blood Count 4.81    4.2-5.4  M/uL


 


Hemoglobin 14.2    12.0-16.0  g/dL


 


Hematocrit 42.6    37-47  %


 


Mean Corpuscular Volume 88.6      fL


 


Mean Corpuscular Hemoglobin 29.5    25-34  pg


 


Mean Corpuscular Hemoglobin


Concent 33.3


  


  


  


  32-36  g/dl


 


 


Platelet Count 315    130-400  K/uL


 


Mean Platelet Volume 10.6    7.4-10.4  fL


 


Neutrophils (%) (Auto) 70.6     %


 


Lymphocytes (%) (Auto) 17.2     %


 


Monocytes (%) (Auto) 8.0     %


 


Eosinophils (%) (Auto) 3.6     %


 


Basophils (%) (Auto) 0.3     %


 


Neutrophils # (Auto) 5.54    1.4-6.5  K/uL


 


Lymphocytes # (Auto) 1.35    1.2-3.4  K/uL


 


Monocytes # (Auto) 0.63    0.11-0.59  K/uL


 


Eosinophils # (Auto) 0.28    0-0.5  K/uL


 


Basophils # (Auto) 0.02    0-0.2  K/uL


 


RDW Standard Deviation 39.8    36.4-46.3  fL


 


RDW Coefficient of Variation 12.5    11.5-14.5  %


 


Immature Granulocyte % (Auto) 0.3     %


 


Immature Granulocyte # (Auto) 0.02    0.00-0.02  K/uL


 


D-Dimer 09383    0-500  ug/L FEU


 


Sodium Level 143    136-145  mmol/L


 


Potassium Level 3.7    3.5-5.1  mmol/L


 


Chloride Level 108      mmol/L


 


Carbon Dioxide Level 27    21-32  mmol/L


 


Anion Gap 8.0    3-11  mmol/L


 


Blood Urea Nitrogen 22    7-18  mg/dl


 


Creatinine


  1.20


  


  


  


  0.60-1.20


mg/dl


 


Est Creatinine Clear Calc


Drug Dose 53.3


  


  


  


   ml/min


 


 


Estimated GFR (


American) 54.5


  


  


  


   


 


 


Estimated GFR (Non-


American 47.1


  


  


  


   


 


 


BUN/Creatinine Ratio 18.3    10-20  


 


Random Glucose 102    70-99  mg/dl


 


Calcium Level 10.5    8.5-10.1  mg/dl


 


Total Bilirubin 0.3    0.2-1  mg/dl


 


Direct Bilirubin < 0.1    0-0.2  mg/dl


 


Aspartate Amino Transf


(AST/SGOT) 24


  


  


  


  15-37  U/L


 


 


Alanine Aminotransferase


(ALT/SGPT) 31


  


  


  


  12-78  U/L


 


 


Alkaline Phosphatase 68      U/L


 


Total Creatine Kinase 46      U/L


 


Creatine Kinase MB < 0.5    0.5-3.6  ng/ml


 


Creatine Kinase MB Ratio     0-3.0  


 


Troponin I 0.023    0-0.045  ng/ml


 


Total Protein 7.6    6.4-8.2  gm/dl


 


Albumin 3.8    3.4-5.0  gm/dl


 


Lipase 268      U/L


 


Prothrombin Time


  


  11.0


  


  


  9.0-12.0


SECONDS


 


Prothromb Time International


Ratio 


  1.0


  


  


  0.9-1.1  


 


 


Activated Partial


Thromboplast Time 


  24.3


  


  


  21.0-31.0


SECONDS


 


Partial Thromboplastin Ratio  0.9    


 


Arterial Blood pH    7.41 7.35-7.45  


 


Arterial Blood Partial


Pressure CO2 


  


  


  34


  35-46  mmHg


 


 


Arterial Blood Partial


Pressure O2 


  


  


  71


  80-95  mm/Hg


 


 


Arterial Blood HCO3    21 19-24  mmol/L


 


Arterial Blood Oxygen


Saturation 


  


  


  93.5


  90-95  %


 


 


Arterial Blood Base Excess    -2.7 -9-1.8  mEq/L


 


Arterial Blood Gas Delivery    2 LITERS  


 


Michael Test    POS POS  


 


Test


  7/19/17


07:08 7/19/17


09:49 


  


  Range/Units


 


 


White Blood Count 7.22    4.8-10.8  K/uL


 


Red Blood Count 4.21    4.2-5.4  M/uL


 


Hemoglobin 12.0    12.0-16.0  g/dL


 


Hematocrit 38.0    37-47  %


 


Mean Corpuscular Volume 90.3      fL


 


Mean Corpuscular Hemoglobin 28.5    25-34  pg


 


Mean Corpuscular Hemoglobin


Concent 31.6


  


  


  


  32-36  g/dl


 


 


Platelet Count 276    130-400  K/uL


 


Mean Platelet Volume 10.7    7.4-10.4  fL


 


Neutrophils (%) (Auto) 68.3     %


 


Lymphocytes (%) (Auto) 19.9     %


 


Monocytes (%) (Auto) 8.7     %


 


Eosinophils (%) (Auto) 2.1     %


 


Basophils (%) (Auto) 0.6     %


 


Neutrophils # (Auto) 4.93    1.4-6.5  K/uL


 


Lymphocytes # (Auto) 1.44    1.2-3.4  K/uL


 


Monocytes # (Auto) 0.63    0.11-0.59  K/uL


 


Eosinophils # (Auto) 0.15    0-0.5  K/uL


 


Basophils # (Auto) 0.04    0-0.2  K/uL


 


RDW Standard Deviation 42.0    36.4-46.3  fL


 


RDW Coefficient of Variation 12.8    11.5-14.5  %


 


Immature Granulocyte % (Auto) 0.4     %


 


Immature Granulocyte # (Auto) 0.03    0.00-0.02  K/uL


 


Sodium Level 143    136-145  mmol/L


 


Potassium Level 4.2    3.5-5.1  mmol/L


 


Chloride Level 109      mmol/L


 


Carbon Dioxide Level 27    21-32  mmol/L


 


Anion Gap 7.0    3-11  mmol/L


 


Blood Urea Nitrogen 21    7-18  mg/dl


 


Creatinine


  1.20


  


  


  


  0.60-1.20


mg/dl


 


Est Creatinine Clear Calc


Drug Dose 53.1


  


  


  


   ml/min


 


 


Estimated GFR (


American) 54.5


  


  


  


   


 


 


Estimated GFR (Non-


American 47.1


  


  


  


   


 


 


BUN/Creatinine Ratio 17.1    10-20  


 


Random Glucose 103    70-99  mg/dl


 


Calcium Level 9.0    8.5-10.1  mg/dl


 


Troponin I 0.018    0-0.045  ng/ml


 


Activated Partial


Thromboplast Time 


  49.9


  


  


  21.0-31.0


SECONDS


 


Partial Thromboplastin Ratio  1.9    











Diagnostic Radiology


TTE from 07/19/2017


  -- Conclusions --


  *  Ejection Fraction = 60-65%.


  *  The right ventricle is not well visualized.


  *  The right ventricle appears mildly dialted in limited views.


  *  The right ventricular function is qualitatively normal in limited views.


  *  There is mild tricuspid regurgitation.


  *  The estimated systolic PAP is 60mmhg.





Chest x-ray from 07/19/2017


FINDINGS:


Cardiomediastinal silhouette normal. Minimal left apical opacity is better


appreciated on subsequent CT. Pleural spaces clear. Osseous structures and upper


abdomen normal.





IMPRESSION:


1.  Minimal left apical opacity better appreciated on subsequent CT.





CT chest from 07/19/2017





1. Extensive bilateral pulmonary emboli bilaterally as above. Straightening of


the intraventricular septum raises the concern for possible associated right


heart strain.


2. Focal groundglass opacity of the left upper lobe may reflect atelectasis,


pneumonitis or small pulmonary infarction.


3. Diffuse fatty infiltration of the liver.


4. Chronic compression deformity of T12.





EKG


Sinus bradycardia 50 bpm


Otherwise normal ECG


When compared with ECG of 06-JUL-2017 15:31,


QT has lengthened





Impression


Assessment and Plan


Bilateral pulmonary emboli


Hypoxemia





Patient has provoked PE secondary to failed DVT prophylaxis of aspirin 325 mg 

twice a day after recent orthopedic surgery.  She is currently hemodynamically 

stable with mild hypoxemia which is resolved with supplemental oxygen of 2 L 

nasal cannula.  CT imaging showed flattening of the intraventricular septum 

which is suggestive of right heart strain however on an echo cardiography this 

was not confirmed in the preliminary read.  She does have elevated pulmonary 

artery pressure of 60 mmHg,  but this is consistent with acute pulmonary 

embolism and showed resolved with adequate treatment.  Her troponins 2 are 

negative which is a good prognostic factor.  BNP have not been sent which 

should be.





Left upper lobe groundglass opacity could be an infarction or atelectasis.  

Infection unlikely.  


A full hypercoagulable workup has been sent.  Patients should be treated with 

full dose anticoagulation for at least 3 months.  Depending on her lifestyle 

and patient preference she can be bridged onto Coumadin or started on a NOAC.  

Continue with heparin drip for now.


Continue with oxygen therapy at 2 L nasal cannula.


Ensure adequate pain control and give incentive spirometry to prevent 

atelectasis.


I appreciate the consult.  Please contact me if you've any further questions or 

concerns.

## 2017-07-19 NOTE — ECHOCARDIOGRAM REPORT
*NOTICE TO RECEIVING PARTY AGENCY**  This information is strictly Confidential and protected under 
[place] law.  [place] law prohibits you from making any further disclosure of this 
information unless further disclosure is expressly permitted by the written consent of the person to 
whom it pertains or is authorized by law.  A general authorization for the release of medical or 
other information is not sufficient for this purpose.  Hospital accepts no responsibility if the 
information is made available to any other person, INCLUDING THE PATIENT.



Interpretation Summary

  *  Name: STEPHANIE CHAVEZ  Study Date: 2017 07:56 AM  BP: 157/78 mmHg

  *  MRN: [id number]  Patient Location: C.2T\S\S242\S\1  HR: 58

  *  : 1950 (M/d/yyyy)  Gender: Female  Height: 64 in

  *  Age: 67 yrs  Ethnicity: [place]  Weight: 222 lb

  *  Ordering Physician: Daljit Jaffe

  *  Referring Physician: Self, Referred

  *  Performed By: Hermila Pedraza RDCS

  *  Accession# [id number]  Account# [id number]

  *  Reason For Study: SOB

  *  BSA: 2.0 m2

  *  The study was technically limited.

  *  Compared to prior study, changes are noted.

  *  -- Conclusions --

  *  Ejection Fraction = 60-65%.

  *  The right ventricle is not well visualized.

  *  The right ventricle appears mildly dialted in limited views.

  *  The right ventricular function is qualitatively normal in limited views.

  *  There is mild tricuspid regurgitation.

  *  The estimated systolic PAP is 60mmhg.

Procedure Details

  *  A contrast injection of Definity was performed to improve assessment of LV function.

  *  Contrast was injected into an intravenous site in the left arm.

  *  One vial of Definity ultrasound contrast was diluted in normal saline to a total volume of 10 
ml.  A total of '2' ml of solution was administered during imaging.

  *  Lot # [id number] of Definity utilized for procedure.

  *  Expiration date 1 AUG 18.

  *  The attending nurse who injected the contrast agent was GRADY TYSON RN.

  *  A complete two-dimensional transthoracic echocardiogram was performed (2D, M-mode, Doppler and 
color flow Doppler).

Left Ventricle

  *  The left ventricle is normal in size.

  *  There is no thrombus.

  *  There is normal left ventricular wall thickness.

  *  Ejection Fraction = 60-65%.

  *  Left ventricular systolic function is normal.

  *  The left ventricular wall motion is normal.

Right Ventricle

  *  The right ventricle is not well visualized.

  *  The right ventricle appears mildly dialted in limited views.

  *  The right ventricular function is qualitatively normal in limited views.

Atria

  *  The left atrial size is normal.

  *  Right atrial size is normal.

  *  There is no evidence of atrial septal defect, but resolution does not allow assessment for a 
patent foramen ovale.

Mitral Valve

  *  The mitral valve is normal.

  *  There is no mitral valve stenosis.

  *  Significant mitral regurgitation is absent.

Tricuspid Valve

  *  The tricuspid valve is normal.

  *  There is no tricuspid stenosis.

  *  There is mild tricuspid regurgitation.

  *  The estimated systolic PAP is 60mmhg.

Aortic Valve

  *  The aortic valve is trileaflet.

  *  Aortic stenosis is absent.

  *  There is no significant aortic regurgitation.

Pulmonic Valve

  *  The pulmonary valve is inadequately visualized, but the Doppler data is adequate for 
interpretation.

  *  There is no pulmonic valvular stenosis.

  *  Trace pulmonic valvular regurgitation.

Great Vessels

  *  The aortic root and proximal ascending aorta are normal sized.

Pericardium/Pleural

  *  There is no pericardial effusion.

Great Vessels

  *  IVC was not visualized.

Left Ventricular Diastolic Function

  *  Pulse wave TDI of the anterior and posterior mitral annulas demonstrates abnormal LV relaxation



MMode 2D Measurements and Calculations

IVSd 1.4 cm

IVSs 1.8 cm



LVIDd 4.1 cm

LVIDs 2.8 cm

LVPWd 1.0 cm

LVPWs 1.2 cm



IVS/LVPW 1.4 

FS 31.4 %

EDV(Teich) 72.9 ml

ESV(Teich) 29.3 ml

EF(Teich) 59.7 %



EDV(cubed) 67.3 ml

ESV(cubed) 21.8 ml

EF(cubed) 67.7 %

% IVS thick 23.4 %

% LVPW thick 19.8 %





LV mass(C)d 177.6 grams

LV mass(C)dI 86.9 grams/m\S\2

LV mass(C)s 145.5 grams

LV mass(C)sI 71.2 grams/m\S\2



SV(Teich) 43.5 ml

SI(Teich) 21.3 ml/m\S\2

SV(cubed) 45.6 ml

SI(cubed) 22.3 ml/m\S\2



Ao root diam 2.8 cm

Ao root area 6.3 cm\S\2

LA dimension 3.6 cm



LA/Ao 1.3 





LVAd ap4 31.2 cm\S\2

LVLd ap4 8.1 cm

EDV(MOD-sp4) 98.9 ml

LVAs ap4 17.1 cm\S\2

LVLs ap4 6.4 cm

ESV(MOD-sp4) 38.2 ml

EF(MOD-sp4) 61.4 %



LVAd ap2 31.8 cm\S\2

LVLd ap2 8.3 cm

EDV(MOD-sp2) 101.0 ml

LVAs ap2 16.3 cm\S\2

LVLs ap2 6.4 cm

ESV(MOD-sp2) 34.5 ml

EF(MOD-sp2) 65.8 %



SV(MOD-sp4) 60.7 ml

SI(MOD-sp4) 29.7 ml/m\S\2



SV(MOD-sp2) 66.5 ml

SI(MOD-sp2) 32.5 ml/m\S\2







Doppler Measurements and Calculations

Ao V2 max 133.6 cm/sec

Ao max PG 7.1 mmHg

Ao max PG (full) 2.4 mmHg



LV V1 max PG 4.7 mmHg



LV V1 max 108.7 cm/sec



TR max jaye 378.6 cm/sec

## 2017-07-19 NOTE — PROGRESS NOTE
Medicine Progress Note


Date & Time of Visit:


Jul 19, 2017 at 17:35.


Subjective


Pt was seen and examined


Sitting in bed with no acute distress with family at bedside


Pt said that she is feeling slightly better


she feels a pressure in her sternum area


breathing is slightly improved


Denies any chest pain, palpitation, dizziness





Objective





Last 8 Hrs








  Date Time  Temp Pulse Resp B/P (MAP) Pulse Ox O2 Delivery O2 Flow Rate FiO2


 


7/19/17 16:00      Nasal Cannula 4.0 


 


7/19/17 15:19 36.7 58 19 124/74 (91) 94 Nasal Cannula 4.0 


 


7/19/17 12:00     92 Nasal Cannula 4.0 


 


7/19/17 11:59 36.6 64 16 129/78 (95) 92 Room Air  








Physical Exam:


General- No acute distress


Head-  atraumatic


Eyes- PERRL, EOMI


ENT- oropharynx clear


Neck- supple, no JVD


Lungs- clear to auscultation


Heart- regular rhythm; no murmur


Abdomen- normal bowel sounds, soft


Extremities- no calf tenderness, left fracture boot in place


Neuro- alert, oriented x 3; PERRL, EOMI


Skin- warm & dry


Laboratory Results:





Last 24 Hours








Test


  7/18/17


22:41 7/18/17


23:41 7/19/17


02:18 7/19/17


02:20


 


White Blood Count 7.84 K/uL    


 


Red Blood Count 4.81 M/uL    


 


Hemoglobin 14.2 g/dL    


 


Hematocrit 42.6 %    


 


Mean Corpuscular Volume 88.6 fL    


 


Mean Corpuscular Hemoglobin 29.5 pg    


 


Mean Corpuscular Hemoglobin


Concent 33.3 g/dl 


  


  


  


 


 


Platelet Count 315 K/uL    


 


Mean Platelet Volume 10.6 fL    


 


Neutrophils (%) (Auto) 70.6 %    


 


Lymphocytes (%) (Auto) 17.2 %    


 


Monocytes (%) (Auto) 8.0 %    


 


Eosinophils (%) (Auto) 3.6 %    


 


Basophils (%) (Auto) 0.3 %    


 


Neutrophils # (Auto) 5.54 K/uL    


 


Lymphocytes # (Auto) 1.35 K/uL    


 


Monocytes # (Auto) 0.63 K/uL    


 


Eosinophils # (Auto) 0.28 K/uL    


 


Basophils # (Auto) 0.02 K/uL    


 


RDW Standard Deviation 39.8 fL    


 


RDW Coefficient of Variation 12.5 %    


 


Immature Granulocyte % (Auto) 0.3 %    


 


Immature Granulocyte # (Auto) 0.02 K/uL    


 


D-Dimer 42215 ug/L FEU    


 


Sodium Level 143 mmol/L    


 


Potassium Level 3.7 mmol/L    


 


Chloride Level 108 mmol/L    


 


Carbon Dioxide Level 27 mmol/L    


 


Anion Gap 8.0 mmol/L    


 


Blood Urea Nitrogen 22 mg/dl    


 


Creatinine 1.20 mg/dl    


 


Est Creatinine Clear Calc


Drug Dose 53.3 ml/min 


  


  


  


 


 


Estimated GFR (


American) 54.5 


  


  


  


 


 


Estimated GFR (Non-


American 47.1 


  


  


  


 


 


BUN/Creatinine Ratio 18.3    


 


Random Glucose 102 mg/dl    


 


Calcium Level 10.5 mg/dl    


 


Total Bilirubin 0.3 mg/dl    


 


Direct Bilirubin < 0.1 mg/dl    


 


Aspartate Amino Transf


(AST/SGOT) 24 U/L 


  


  


  


 


 


Alanine Aminotransferase


(ALT/SGPT) 31 U/L 


  


  


  


 


 


Alkaline Phosphatase 68 U/L    


 


Total Creatine Kinase 46 U/L    


 


Creatine Kinase MB < 0.5 ng/ml    


 


Creatine Kinase MB Ratio     


 


Troponin I 0.023 ng/ml    


 


Total Protein 7.6 gm/dl    


 


Albumin 3.8 gm/dl    


 


Lipase 268 U/L    


 


Prothrombin Time  11.0 SECONDS   


 


Prothromb Time International


Ratio 


  1.0 


  


  


 


 


Activated Partial


Thromboplast Time 


  24.3 SECONDS 


  


  


 


 


Partial Thromboplastin Ratio  0.9   


 


Arterial Blood pH    7.41 


 


Arterial Blood Partial


Pressure CO2 


  


  


  34 mmHg 


 


 


Arterial Blood Partial


Pressure O2 


  


  


  71 mm/Hg 


 


 


Arterial Blood HCO3    21 mmol/L 


 


Arterial Blood Oxygen


Saturation 


  


  


  93.5 % 


 


 


Arterial Blood Base Excess    -2.7 mEq/L 


 


Arterial Blood Gas Delivery    2 LITERS 


 


Michael Test    POS 


 


Test


  7/19/17


07:08 7/19/17


09:49 


  


 


 


White Blood Count 7.22 K/uL    


 


Red Blood Count 4.21 M/uL    


 


Hemoglobin 12.0 g/dL    


 


Hematocrit 38.0 %    


 


Mean Corpuscular Volume 90.3 fL    


 


Mean Corpuscular Hemoglobin 28.5 pg    


 


Mean Corpuscular Hemoglobin


Concent 31.6 g/dl 


  


  


  


 


 


Platelet Count 276 K/uL    


 


Mean Platelet Volume 10.7 fL    


 


Neutrophils (%) (Auto) 68.3 %    


 


Lymphocytes (%) (Auto) 19.9 %    


 


Monocytes (%) (Auto) 8.7 %    


 


Eosinophils (%) (Auto) 2.1 %    


 


Basophils (%) (Auto) 0.6 %    


 


Neutrophils # (Auto) 4.93 K/uL    


 


Lymphocytes # (Auto) 1.44 K/uL    


 


Monocytes # (Auto) 0.63 K/uL    


 


Eosinophils # (Auto) 0.15 K/uL    


 


Basophils # (Auto) 0.04 K/uL    


 


RDW Standard Deviation 42.0 fL    


 


RDW Coefficient of Variation 12.8 %    


 


Immature Granulocyte % (Auto) 0.4 %    


 


Immature Granulocyte # (Auto) 0.03 K/uL    


 


Sodium Level 143 mmol/L    


 


Potassium Level 4.2 mmol/L    


 


Chloride Level 109 mmol/L    


 


Carbon Dioxide Level 27 mmol/L    


 


Anion Gap 7.0 mmol/L    


 


Blood Urea Nitrogen 21 mg/dl    


 


Creatinine 1.20 mg/dl    


 


Est Creatinine Clear Calc


Drug Dose 53.1 ml/min 


  


  


  


 


 


Estimated GFR (


American) 54.5 


  


  


  


 


 


Estimated GFR (Non-


American 47.1 


  


  


  


 


 


BUN/Creatinine Ratio 17.1    


 


Random Glucose 103 mg/dl    


 


Calcium Level 9.0 mg/dl    


 


Troponin I 0.018 ng/ml    


 


Pro-B-Type Natriuretic Peptide 1549 pg/ml    


 


Activated Partial


Thromboplast Time 


  49.9 SECONDS 


  


  


 


 


Partial Thromboplastin Ratio  1.9   











Assessment & Plan


Acute B/L PE


Acute LLE  DVT


Present with chest pain and hypoxia on admission 


Provoked by recent orthopedic surgery in the LLE


LE venous Doppler showed acute left lower extremity DVT with involvement of the 

popliteal and peroneal veins


CTA chest showed Extensive bilateral pulmonary emboli bilaterally associated 

right heart strain.


On heparin drip


Continue oxygen supplement


will need to be anticoagulate for at least 3 months


Discussed with pt about anticoagulant with Coumadin vs NOAC 


discussed about bleeding risks


Pt will decide tomorrow about anticoagulant


Continue incentive spirometry


hypercoagulable workup sent


Pulmonary on board


Echo done showed 


  *  Ejection Fraction = 60-65%.


  *  The right ventricle is not well visualized.


  *  The right ventricle appears mildly dialted in limited views.


  *  The right ventricular function is qualitatively normal in limited views.


  *  There is mild tricuspid regurgitation.


  *  The estimated systolic PAP is 60mmhg.





S/P Left Ankle Surgery


Failed ASA thrombo-embolic prophylaxis


Ortho on board


Plan to removed staple on Friday if she is still in the hospital


Continue fracture boot during transportation





Hypertension


Stable





DVT px on heparin subq





CODE STATUS FULL CODE


Consultants:


Pulmonary


Ortho


Current Inpatient Medications:





Current Inpatient Medications








 Medications


  (Trade)  Dose


 Ordered  Sig/Mora


 Route  Start Time


 Stop Time Status Last Admin


Dose Admin


 


 Ioversol


  (Optiray 320)  100 ml  UD  PRN


 IV  7/19/17 00:15


 7/23/17 00:14   


 


 


 Acetaminophen


  (Tylenol Tab)  650 mg  Q4H  PRN


 PO  7/19/17 02:30


 8/18/17 02:29  7/19/17 14:03


650 MG


 


 Hydromorphone HCl


  (Dilaudid Inj)  0.5 mg  Q3H  PRN


 IV  7/19/17 02:30


 8/2/17 02:29   


 


 


 Ondansetron HCl


  (Zofran Inj)  4 mg  Q6H  PRN


 IV  7/19/17 02:30


 8/18/17 02:29   


 


 


 Atenolol


  (Tenormin Tab)  50 mg  QAM


 PO  7/20/17 09:00


 8/19/17 08:59   


 


 


 Hydroxyzine HCl


  (Vistaril Tab)  25 mg  QAM


 PO  7/19/17 09:00


 8/18/17 08:59  7/19/17 08:09


25 MG


 


 Levothyroxine


 Sodium


  (Synthroid Tab)  112 mcg  DAILYBB


 PO  7/19/17 06:00


 8/18/17 05:59  7/19/17 08:09


112 MCG


 


 Ranitidine HCl


  (zANTac TAB)  150 mg  BID


 PO  7/19/17 09:00


 8/18/17 08:59  7/19/17 08:09


150 MG


 


 Fenofibrate


  (Tricor Tab)  145 mg  QAM


 PO  7/19/17 09:00


 8/18/17 08:59  7/19/17 08:09


145 MG


 


 Acetaminophen/


 Hydrocodone Bitart


  (Norco 5/325 Tab)  1 tab  Q4H  PRN


 PO  7/19/17 02:30


 8/2/17 02:29   


 


 


 Albuterol/


 Ipratropium


  (Duoneb)  3 ml  Q2H  PRN


 INH  7/19/17 02:30


 8/18/17 02:29   


 


 


 Heparin Sodium


  (Porcine) 92085


 unit/Dextrose  500 ml @ 


 26 mls/hr  [id number] PRN


 IV  7/19/17 03:15


 8/18/17 03:14  7/19/17 03:36


26 MLS/HR

## 2017-07-19 NOTE — ORTHOPEDIC CONSULTATION
Orthopedic Consultation


Date of Consultation:


Jul 19, 2017.


Attending Physician:


Heidy Rollins M.D.


Reason for Consultation:


Current pulmonary embolism


S/P Left ankle ORIF


History of Present Illness


Anne is a 68 y/o female complaining of chest pain and shortness of breath.  She 

is s/p Left ankle ORIF trimalleolar fracture completed by Dr. Kimani Paez MD 

from [facility] on 7/10/17.  She was utilizing Aspirin 325mg BID 

for DVT prophylaxis.  Denied pain in the left calf.  Denies numbness and 

tingling in the lower extremities.





Past Medical/Surgical History


Medical Problems:


(1) Trimalleolar fracture of ankle, closed


Status: Acute  








Family History





Diabetes mellitus


Gallbladder disease





Social History


Smoking Status:  Current Every Day Smoker


Marital Status:  


Housing Status:  lives with significant other


Occupation Status:  employed





Allergies


Coded Allergies:  


     Amoxicillin (Verified  Allergy, Intermediate, DIARRHEA, 7/18/17)


     Sulfamethoxazole w/Trimethoprim (Verified  Allergy, Intermediate, HEADACHES

, 7/18/17)


     Adhesives (Verified  Allergy, Unknown, BURNING RASH, 7/18/17)


     Codeine (Verified  Allergy, Unknown, HEADACHES OR NAUSEA -- UNSURE, 7/18/17

)


     Gemfibrozil (Verified  Allergy, Unknown, UNKNOWN, 7/18/17)


     Minocycline (Verified  Allergy, Unknown, ., 7/18/17)


     Nitrofurantoin (Verified  Allergy, Unknown, UNKNOWN, 7/18/17)


     Morphine (Verified  Adverse Reaction, Severe, abdominal and neck pain, 

nausea, flushed, 7/19/17)


     POLLEN (Verified  Adverse Reaction, Unknown, UNKNOWN, 7/18/17)


Uncoded Allergies:  


     MOLD (Adverse Reaction, Unknown, UNKNOWN, 10/13/14)





Home Medications


Scheduled


Aspirin Enteric Coated (Ecotrin Or Generic), 325 MG PO BID


Atenolol (Tenormin), 50 MG PO QAM


Calcium Carbonate-Vitamin D (Calcium 600+D3), 1 TAB PO BID


Fenofibrate (Tricor), 160 MG PO QAM


Fish Oil (Hull-3), 1 CAP PO BID


Hydroxyzine Hcl (Atarax), 25 MG PO QAM


Levothyroxine Sodium (Levothyroxine Sodium), 112 MCG PO QAM


Ranitidine (Zantac), 150 MG PO BID





Scheduled PRN


Acetaminophen (Tylenol), 1,000 MG PO Q6H PRN for Pain


Hydrocodone-Acetaminophen (Lortab 5-325 mg), 1 TAB PO Q4H PRN for Pain





Current Inpatient Medications





Current Inpatient Medications








 Medications


  (Trade)  Dose


 Ordered  Sig/Mora


 Route  Start Time


 Stop Time Status Last Admin


Dose Admin


 


 Ioversol


  (Optiray 320)  100 ml  UD  PRN


 IV  7/19/17 00:15


 7/23/17 00:14   


 


 


 Potassium


 Chloride/Sodium


 Chloride  1,000 ml @ 


 75 mls/hr  [id number] ONCE


 IV  7/19/17 03:00


 7/19/17 16:19  7/19/17 03:34


75 MLS/HR


 


 Acetaminophen


  (Tylenol Tab)  650 mg  Q4H  PRN


 PO  7/19/17 02:30


 8/18/17 02:29  7/19/17 06:40


650 MG


 


 Hydromorphone HCl


  (Dilaudid Inj)  0.5 mg  Q3H  PRN


 IV  7/19/17 02:30


 8/2/17 02:29   


 


 


 Ondansetron HCl


  (Zofran Inj)  4 mg  Q6H  PRN


 IV  7/19/17 02:30


 8/18/17 02:29   


 


 


 Atenolol


  (Tenormin Tab)  50 mg  QAM


 PO  7/20/17 09:00


 8/19/17 08:59   


 


 


 Hydroxyzine HCl


  (Vistaril Tab)  25 mg  QAM


 PO  7/19/17 09:00


 8/18/17 08:59  7/19/17 08:09


25 MG


 


 Levothyroxine


 Sodium


  (Synthroid Tab)  112 mcg  DAILYBB


 PO  7/19/17 06:00


 8/18/17 05:59  7/19/17 08:09


112 MCG


 


 Ranitidine HCl


  (zANTac TAB)  150 mg  BID


 PO  7/19/17 09:00


 8/18/17 08:59  7/19/17 08:09


150 MG


 


 Fenofibrate


  (Tricor Tab)  145 mg  QAM


 PO  7/19/17 09:00


 8/18/17 08:59  7/19/17 08:09


145 MG


 


 Acetaminophen/


 Hydrocodone Bitart


  (Norco 5/325 Tab)  1 tab  Q4H  PRN


 PO  7/19/17 02:30


 8/2/17 02:29   


 


 


 Albuterol/


 Ipratropium


  (Duoneb)  3 ml  Q2H  PRN


 INH  7/19/17 02:30


 8/18/17 02:29   


 


 


 Heparin Sodium


  (Porcine) 66037


 unit/Dextrose  500 ml @ 


 26 mls/hr  [id number] PRN


 IV  7/19/17 03:15


 8/18/17 03:14  7/19/17 03:36


26 MLS/HR











Review of Systems


Constitutional:  No fever, No chills, No sweats, No weight loss, No weakness, 

No fatigue, No problem reported


Respiratory:  + shortness of breath (improving )


Cardiovascular:  + chest pain (improving)


Abdomen:  No pain, No nausea, No vomiting, No diarrhea, No constipation, No GI 

bleeding, No problem reported


Musculoskeletal:  No joint pain, No muscle pain, No swelling, No calf pain, No 

problem reported


Neurologic:  No memory loss, No paralysis, No weakness, No numbness/tingling, 

No vertigo, No balance problems, No problem reported


Integumentary:  No rash, No itch, No new/changing skin lesions, No color change

, No bleeding, No problem reported





Physical Exam











  Date Time  Temp Pulse Resp B/P (MAP) Pulse Ox O2 Delivery O2 Flow Rate FiO2


 


7/19/17 08:11 36.7 64 16 121/63 (82) 94   


 


7/19/17 03:03 36.5 87 24 157/78 94 Nasal Cannula 3.0 


 


7/19/17 02:30 36.6 67 18 167/96 96   


 


7/19/17 02:01    167/96    


 


7/19/17 01:31    162/86    


 


7/19/17 01:20  67 18  96   


 


7/19/17 01:18  71 18 164/81 95 Nasal Cannula 3.0 


 


7/19/17 01:15    164/81    


 


7/19/17 00:36  67 21  90   


 


7/19/17 00:31    161/89    


 


7/19/17 00:07     93 Nasal Cannula 2.0 


 


7/19/17 00:06     91 Room Air  


 


7/19/17 00:06  69 21  89   


 


7/19/17 00:06     89 Room Air  


 


7/19/17 00:01    157/86    


 


7/19/17 00:00  63 18  91   


 


7/18/17 23:31    158/90    


 


7/18/17 23:30  66 19  92   


 


7/18/17 23:25  66 18 159/86 91   


 


7/18/17 23:23     90 Room Air  


 


7/18/17 23:20    148/94    


 


7/18/17 23:13  64      


 


7/18/17 23:12 36.6 71 20 145/87 90 Room Air  


 


7/18/17 23:12     90 Room Air  








Resting comfortably


Pleasant and smiling during conversation


General Appearance:  WD/WN, no apparent distress


Head:  normocephalic, atraumatic


Eyes:  normal inspection


ENT:  hearing grossly normal


Extremities/Musculoskelatal:  normal inspection, no calf tenderness, normal 

capillary refill, no pedal edema, normal range of motion, non-tender, + 

pertinent finding (left ankle incision and surgical staples noted post-

operatively, minimal ecchymosis and erythema, no significant delmer-incisional 

drainage, her fracture boot was in place )


Neurologic/Psych:  no motor/sensory deficits, alert, normal mood/affect, 

oriented x 3


Skin:  normal color, warm/dry, no rash





Laboratory Results





Last 24 Hours








Test


  7/18/17


22:41 7/18/17


23:41 7/19/17


02:18 7/19/17


02:20


 


White Blood Count 7.84 K/uL    


 


Red Blood Count 4.81 M/uL    


 


Hemoglobin 14.2 g/dL    


 


Hematocrit 42.6 %    


 


Mean Corpuscular Volume 88.6 fL    


 


Mean Corpuscular Hemoglobin 29.5 pg    


 


Mean Corpuscular Hemoglobin


Concent 33.3 g/dl 


  


  


  


 


 


Platelet Count 315 K/uL    


 


Mean Platelet Volume 10.6 fL    


 


Neutrophils (%) (Auto) 70.6 %    


 


Lymphocytes (%) (Auto) 17.2 %    


 


Monocytes (%) (Auto) 8.0 %    


 


Eosinophils (%) (Auto) 3.6 %    


 


Basophils (%) (Auto) 0.3 %    


 


Neutrophils # (Auto) 5.54 K/uL    


 


Lymphocytes # (Auto) 1.35 K/uL    


 


Monocytes # (Auto) 0.63 K/uL    


 


Eosinophils # (Auto) 0.28 K/uL    


 


Basophils # (Auto) 0.02 K/uL    


 


RDW Standard Deviation 39.8 fL    


 


RDW Coefficient of Variation 12.5 %    


 


Immature Granulocyte % (Auto) 0.3 %    


 


Immature Granulocyte # (Auto) 0.02 K/uL    


 


D-Dimer 57053 ug/L FEU    


 


Sodium Level 143 mmol/L    


 


Potassium Level 3.7 mmol/L    


 


Chloride Level 108 mmol/L    


 


Carbon Dioxide Level 27 mmol/L    


 


Anion Gap 8.0 mmol/L    


 


Blood Urea Nitrogen 22 mg/dl    


 


Creatinine 1.20 mg/dl    


 


Est Creatinine Clear Calc


Drug Dose 53.3 ml/min 


  


  


  


 


 


Estimated GFR (


American) 54.5 


  


  


  


 


 


Estimated GFR (Non-


American 47.1 


  


  


  


 


 


BUN/Creatinine Ratio 18.3    


 


Random Glucose 102 mg/dl    


 


Calcium Level 10.5 mg/dl    


 


Total Bilirubin 0.3 mg/dl    


 


Direct Bilirubin < 0.1 mg/dl    


 


Aspartate Amino Transf


(AST/SGOT) 24 U/L 


  


  


  


 


 


Alanine Aminotransferase


(ALT/SGPT) 31 U/L 


  


  


  


 


 


Alkaline Phosphatase 68 U/L    


 


Total Creatine Kinase 46 U/L    


 


Creatine Kinase MB < 0.5 ng/ml    


 


Creatine Kinase MB Ratio     


 


Troponin I 0.023 ng/ml    


 


Total Protein 7.6 gm/dl    


 


Albumin 3.8 gm/dl    


 


Lipase 268 U/L    


 


Prothrombin Time  11.0 SECONDS   


 


Prothromb Time International


Ratio 


  1.0 


  


  


 


 


Activated Partial


Thromboplast Time 


  24.3 SECONDS 


  


  


 


 


Partial Thromboplastin Ratio  0.9   


 


Arterial Blood pH    7.41 


 


Arterial Blood Partial


Pressure CO2 


  


  


  34 mmHg 


 


 


Arterial Blood Partial


Pressure O2 


  


  


  71 mm/Hg 


 


 


Arterial Blood HCO3    21 mmol/L 


 


Arterial Blood Oxygen


Saturation 


  


  


  93.5 % 


 


 


Arterial Blood Base Excess    -2.7 mEq/L 


 


Arterial Blood Gas Delivery    2 LITERS 


 


Michael Test    POS 


 


Test


  7/19/17


07:08 


  


  


 


 


White Blood Count 7.22 K/uL    


 


Red Blood Count 4.21 M/uL    


 


Hemoglobin 12.0 g/dL    


 


Hematocrit 38.0 %    


 


Mean Corpuscular Volume 90.3 fL    


 


Mean Corpuscular Hemoglobin 28.5 pg    


 


Mean Corpuscular Hemoglobin


Concent 31.6 g/dl 


  


  


  


 


 


Platelet Count 276 K/uL    


 


Mean Platelet Volume 10.7 fL    


 


Neutrophils (%) (Auto) 68.3 %    


 


Lymphocytes (%) (Auto) 19.9 %    


 


Monocytes (%) (Auto) 8.7 %    


 


Eosinophils (%) (Auto) 2.1 %    


 


Basophils (%) (Auto) 0.6 %    


 


Neutrophils # (Auto) 4.93 K/uL    


 


Lymphocytes # (Auto) 1.44 K/uL    


 


Monocytes # (Auto) 0.63 K/uL    


 


Eosinophils # (Auto) 0.15 K/uL    


 


Basophils # (Auto) 0.04 K/uL    


 


RDW Standard Deviation 42.0 fL    


 


RDW Coefficient of Variation 12.8 %    


 


Immature Granulocyte % (Auto) 0.4 %    


 


Immature Granulocyte # (Auto) 0.03 K/uL    


 


Sodium Level 143 mmol/L    


 


Potassium Level 4.2 mmol/L    


 


Chloride Level 109 mmol/L    


 


Carbon Dioxide Level 27 mmol/L    


 


Anion Gap 7.0 mmol/L    


 


Blood Urea Nitrogen 21 mg/dl    


 


Creatinine 1.20 mg/dl    


 


Est Creatinine Clear Calc


Drug Dose 53.1 ml/min 


  


  


  


 


 


Estimated GFR (


American) 54.5 


  


  


  


 


 


Estimated GFR (Non-


American 47.1 


  


  


  


 


 


BUN/Creatinine Ratio 17.1    


 


Random Glucose 103 mg/dl    


 


Calcium Level 9.0 mg/dl    


 


Troponin I 0.018 ng/ml    











Assessment & Plan


ASSESSMENT:  


1) S/P Left ankle ORIF


2) Pulmonary embolism 





PLAN:


Continue post-operative care regarding her left lower extremity, ice, elevate, 

fracture boot during transportation.  Continue medical care regarding PE.  

Continue orders and meds per medicine.  Discussed patient's care with Dr. Paez

, whom performed her left ankle surgery.  We will continue to monitor.  If the 

patient is still admitted Friday 7/21/17, we may potentially remove staples, 

which would be done by one of the [facility] PA's.  Any other orthopedic 

questions or concerns please notify [facility] at , 

thank you.

## 2017-07-19 NOTE — DIAGNOSTIC IMAGING REPORT
(CHEST FOR PE) ANGIO WITH



CT DOSE: 595.00 mGycm



HISTORY: 67-year-old female presents with acute shortness of breath and recent

surgery. History of blood clots     



TECHNIQUE: Multiple CTA images of the chest were obtained after the intravenous

administration of 94 Optiray 320.  Coronal and sagittal MIPS were obtained from

the axial data set and were submitted for review.



COMPARISON: Portable chest radiograph of same day chest CT 3/7/2012.



FINDINGS: 



CTA: There is normal size of the heart. There is mild straightening of the

intraventricular septum. Thoracic aorta is normal in both course and caliber

without aneurysm or dissection. Multiple bilateral soft tissue attenuating

filling defects are present within the pulmonary arterial tree compatible with

thromboembolic disease. For example large embolus is seen within the distal

right main pulmonary artery extending into the upper and lower lobar and

segmental branches. Additional emboli involve the left lower lobar and segmental

branches with smaller emboli seen extending into the segmental lingular

branches.

 

CT CHEST: Thyroid appears normal. No pathologically enlarged lymph nodes by CT

size criteria. Mildly prominent subcarinal lymph nodes are nonspecific and

likely reactive. No pneumothorax. Thin-walled cystic changes are scattered

within the lungs bilaterally. There is a focal ill-defined groundglass opacity

of the apical posterior segment left upper lobe. No large pulmonary infarction

is identified. Small pericardial lymph node involves the right minor fissure. No

pleural effusion.



There is diffuse fatty infiltration of the liver. Soft tissues are unremarkable.

45% anterior endplate compression deformity of the T12 vertebral body is noted,

unchanged from 3/7/2012.



IMPRESSION:  

1. Extensive bilateral pulmonary emboli bilaterally as above. Straightening of

the intraventricular septum raises the concern for possible associated right

heart strain.

2. Focal groundglass opacity of the left upper lobe may reflect atelectasis,

pneumonitis or small pulmonary infarction.

3. Diffuse fatty infiltration of the liver.

4. Chronic compression deformity of T12.





Findings were discussed with the statrad radiologist at 01:23 hours with results

transmitted at 01:52



The above report was generated using voice recognition software. It may contain

grammatical, syntax or spelling errors.







Electronically signed by:  Donald Garcia M.D.

7/19/2017 7:50 AM



Dictated Date/Time:  7/19/2017 7:08 AM

## 2017-07-19 NOTE — HISTORY AND PHYSICAL
History & Physical


Date & Time of Service:


Jul 19, 2017 at 02:37


Chief Complaint:


Chest Pain


Primary Care Physician:


Jacinta Perez


History of Present Illness


Source:  patient, spouse, hospital records


Recent confinement under orthopedic service last for left ankle fracture status 

post ORIF.


As per patient and , they were instructed to take full dose aspirin for 

the next few weeks postop to prevent clots.





1 day history of shortness of breath and epigastric discomfort.


No cough symptoms.


Patient denies actual chest pain.


Left lower extremity a little swollen.





Patient brought to the emergency room.





Past Medical/Surgical History


Medical Problems:


(1) Benign hypertension


Status: Chronic  





(2) Chronic Sinusitis Nos


Status: Chronic  





(3) Esophageal Reflux


Status: Chronic  





(4) Gastroesophageal reflux disease


Status: Chronic  





(5) Hyperlipidemia Nec/Nos


Status: Chronic  





(6) Hypertension Nos


Status: Chronic  





(7) Hypothyroidism


Status: Chronic  








Surgeries :





Orthopedic procedure


Hysterectomy


Cholecystectomy





Family History





Diabetes mellitus


Gallbladder disease





Social History


Smoking Status:  Never Smoker


Marital Status:  


Occupational Status:  employed





Immunizations


History of Influenza Vaccine:  Yes


History of Tetanus Vaccine?:  Yes


History of Pneumococcal:  No


History of Hepatitis B Vaccine:  No





Multi-Drug Resistant Organisms


History of MDRO:  No





Allergies


Coded Allergies:  


     Amoxicillin (Verified  Allergy, Intermediate, DIARRHEA, 7/18/17)


     Sulfamethoxazole w/Trimethoprim (Verified  Allergy, Intermediate, HEADACHES

, 7/18/17)


     Adhesives (Verified  Allergy, Unknown, BURNING RASH, 7/18/17)


     Codeine (Verified  Allergy, Unknown, HEADACHES OR NAUSEA -- UNSURE, 7/18/17

)


     Gemfibrozil (Verified  Allergy, Unknown, UNKNOWN, 7/18/17)


     Minocycline (Verified  Allergy, Unknown, ., 7/18/17)


     Nitrofurantoin (Verified  Allergy, Unknown, UNKNOWN, 7/18/17)


     Morphine (Verified  Adverse Reaction, Severe, abdominal and neck pain, 

nausea, flushed, 7/19/17)


     POLLEN (Verified  Adverse Reaction, Unknown, UNKNOWN, 7/18/17)


Uncoded Allergies:  


     MOLD (Adverse Reaction, Unknown, UNKNOWN, 10/13/14)





Home Medications


Scheduled


Aspirin Enteric Coated (Ecotrin Or Generic), 325 MG PO BID


Atenolol (Tenormin), 50 MG PO QAM


Calcium Carbonate-Vitamin D (Calcium 600+D3), 1 TAB PO BID


Fenofibrate (Tricor), 160 MG PO QAM


Fish Oil ([place]-3), 1 CAP PO BID


Hydroxyzine Hcl (Atarax), 25 MG PO QAM


Levothyroxine Sodium (Levothyroxine Sodium), 112 MCG PO QAM


Ranitidine (Zantac), 150 MG PO BID





Scheduled PRN


Acetaminophen (Tylenol), 1,000 MG PO Q6H PRN for Pain


Hydrocodone-Acetaminophen (Lortab 5-325 mg), 1 TAB PO Q4H PRN for Pain





Review of Systems


As per history of present illness all other ROS negative





Physical Exam


Vital Signs











  Date Time  Temp Pulse Resp B/P (MAP) Pulse Ox O2 Delivery O2 Flow Rate FiO2


 


7/19/17 02:30 36.6 67 18 167/96 96   


 


7/19/17 02:01    167/96    


 


7/19/17 01:31    162/86    


 


7/19/17 01:20  67 18  96   


 


7/19/17 01:18  71 18 164/81 95 Nasal Cannula 3.0 


 


7/19/17 01:15    164/81    


 


7/19/17 00:36  67 21  90   


 


7/19/17 00:31    161/89    


 


7/19/17 00:07     93 Nasal Cannula 2.0 


 


7/19/17 00:06     91 Room Air  


 


7/19/17 00:06  69 21  89   


 


7/19/17 00:06     89 Room Air  


 


7/19/17 00:01    157/86    


 


7/19/17 00:00  63 18  91   


 


7/18/17 23:31    158/90    


 


7/18/17 23:30  66 19  92   


 


7/18/17 23:25  66 18 159/86 91   


 


7/18/17 23:23     90 Room Air  


 


7/18/17 23:20    148/94    


 


7/18/17 23:13  64      


 


7/18/17 23:12 36.6 71 20 145/87 90 Room Air  


 


7/18/17 23:12     90 Room Air  








General Appearance:  + obese, + pertinent finding (slightly anxious, receiving 

a breathing treatment)


Head:  normocephalic


Eyes:  normal inspection


Neck:  + pertinent finding (short)


Respiratory/Chest:  + decreased breath sounds


Cardiovascular:  regular rate, rhythm


Abdomen/GI:  soft


Extremities/Musculoskelatal:  + pertinent finding (immobilization device on the 

left lower extremity)


Neurologic/Psych:  alert, + pertinent finding (coherent)


Skin:  normal color





Diagnostics


Laboratory Results





Results Past 24 Hours








Test


  7/18/17


22:41 7/18/17


23:41 7/19/17


01:19 7/19/17


02:20 Range/Units


 


 


White Blood Count 7.84    4.8-10.8  K/uL


 


Red Blood Count 4.81    4.2-5.4  M/uL


 


Hemoglobin 14.2    12.0-16.0  g/dL


 


Hematocrit 42.6    37-47  %


 


Mean Corpuscular Volume 88.6      fL


 


Mean Corpuscular Hemoglobin 29.5    25-34  pg


 


Mean Corpuscular Hemoglobin


Concent 33.3


  


  


  


  32-36  g/dl


 


 


Platelet Count 315    130-400  K/uL


 


Mean Platelet Volume 10.6    7.4-10.4  fL


 


Neutrophils (%) (Auto) 70.6     %


 


Lymphocytes (%) (Auto) 17.2     %


 


Monocytes (%) (Auto) 8.0     %


 


Eosinophils (%) (Auto) 3.6     %


 


Basophils (%) (Auto) 0.3     %


 


Neutrophils # (Auto) 5.54    1.4-6.5  K/uL


 


Lymphocytes # (Auto) 1.35    1.2-3.4  K/uL


 


Monocytes # (Auto) 0.63    0.11-0.59  K/uL


 


Eosinophils # (Auto) 0.28    0-0.5  K/uL


 


Basophils # (Auto) 0.02    0-0.2  K/uL


 


RDW Standard Deviation 39.8    36.4-46.3  fL


 


RDW Coefficient of Variation 12.5    11.5-14.5  %


 


Immature Granulocyte % (Auto) 0.3     %


 


Immature Granulocyte # (Auto) 0.02    0.00-0.02  K/uL


 


D-Dimer 23094    0-500  ug/L FEU


 


Sodium Level 143    136-145  mmol/L


 


Potassium Level 3.7    3.5-5.1  mmol/L


 


Chloride Level 108      mmol/L


 


Carbon Dioxide Level 27    21-32  mmol/L


 


Anion Gap 8.0    3-11  mmol/L


 


Blood Urea Nitrogen 22    7-18  mg/dl


 


Creatinine


  1.20


  


  


  


  0.60-1.20


mg/dl


 


Est Creatinine Clear Calc


Drug Dose 53.3


  


  


  


   ml/min


 


 


Estimated GFR (


American) 54.5


  


  


  


   


 


 


Estimated GFR (Non-


American 47.1


  


  


  


   


 


 


BUN/Creatinine Ratio 18.3    10-20  


 


Random Glucose 102    70-99  mg/dl


 


Calcium Level 10.5    8.5-10.1  mg/dl


 


Total Bilirubin 0.3    0.2-1  mg/dl


 


Direct Bilirubin < 0.1    0-0.2  mg/dl


 


Aspartate Amino Transf


(AST/SGOT) 24


  


  


  


  15-37  U/L


 


 


Alanine Aminotransferase


(ALT/SGPT) 31


  


  


  


  12-78  U/L


 


 


Alkaline Phosphatase 68      U/L


 


Total Creatine Kinase 46      U/L


 


Creatine Kinase MB < 0.5    0.5-3.6  ng/ml


 


Creatine Kinase MB Ratio     0-3.0  


 


Troponin I 0.023    0-0.045  ng/ml


 


Total Protein 7.6    6.4-8.2  gm/dl


 


Albumin 3.8    3.4-5.0  gm/dl


 


Lipase 268      U/L


 


Prothrombin Time


  


  11.0


  


  


  9.0-12.0


SECONDS


 


Prothromb Time International


Ratio 


  1.0


  


  


  0.9-1.1  


 


 


Activated Partial


Thromboplast Time 


  24.3


  


  


  21.0-31.0


SECONDS


 


Partial Thromboplastin Ratio  0.9    


 


Arterial Blood pH    7.41 7.35-7.45  


 


Arterial Blood Partial


Pressure CO2 


  


  


  34


  35-46  mmHg


 


 


Arterial Blood Partial


Pressure O2 


  


  


  71


  80-95  mm/Hg


 


 


Arterial Blood HCO3    21 19-24  mmol/L


 


Arterial Blood Oxygen


Saturation 


  


  


  93.5


  90-95  %


 


 


Arterial Blood Base Excess    -2.7 -9-1.8  mEq/L


 


Arterial Blood Gas Delivery    2 LITERS  


 


Michael Test    POS POS  











Diagnostic Radiology


CT chest initial read bilateral PE


other (atelectasis as per my interpretation)





EKG


As per my interpretation: Rate 65, NSR, nonspecific T-wave abnormalities





Impression


Assessment and Plan


AP





Acute hypoxemic respiratory failure secondary to acute pulmonary embolism


Recent left ankle surgery


Failed ASA thrombo-embolic prophylaxis





Hypertension, slightly elevated





PCU


Supplemental O2


Baseline ABG


IV heparin (Orthopedic surgeon on call, Dr. Avelar, agreeable)


Defer discussion regarding choice of oral anticoagulant between patient and 

a.m. provider


TTE RE pulmonary embolism with hypoxemia rule out pulmonary hypertension/RV 

strain


Lower extremity Dopplers rule out DVT as source of PE


Orthopedics consult postop eval


DVT prophylaxis, IV  heparin


Full code





Total critical care time 40 minutes





VTE Prophylaxis


VTE Risk Assessment Done? Y/N:  Yes


Risk Level:  High

## 2017-07-19 NOTE — DIAGNOSTIC IMAGING REPORT
ULTRASOUND VENOUS DOPPLER LWR EXT BILA 



CLINICAL HISTORY: Pulmonary embolism LEG SWELLING



COMPARISON STUDY:  No previous studies for comparison. 



FINDINGS: 



On the right, no intraluminal thrombus was visualized. The veins are fully

compressible from the groin through the popliteal vein. There is normal

color-flow the proximal trifurcation veins of the right calf.



In the left, no thrombus is visualized in the left common femoral or superficial

femoral veins. There is thrombus within the left popliteal vein which appears

acute. There is left peroneal vein thrombus.



IMPRESSION: Acute left lower extremity DVT with involvement of the popliteal and

peroneal veins







Electronically signed by:  Dex Mccormick M.D.

7/19/2017 12:48 PM



Dictated Date/Time:  7/19/2017 12:47 PM

## 2017-07-20 VITALS
DIASTOLIC BLOOD PRESSURE: 66 MMHG | OXYGEN SATURATION: 93 % | SYSTOLIC BLOOD PRESSURE: 110 MMHG | HEART RATE: 58 BPM | TEMPERATURE: 98.06 F

## 2017-07-20 VITALS
DIASTOLIC BLOOD PRESSURE: 62 MMHG | HEART RATE: 62 BPM | OXYGEN SATURATION: 93 % | SYSTOLIC BLOOD PRESSURE: 110 MMHG | TEMPERATURE: 98.78 F

## 2017-07-20 VITALS
TEMPERATURE: 98.06 F | DIASTOLIC BLOOD PRESSURE: 70 MMHG | OXYGEN SATURATION: 95 % | SYSTOLIC BLOOD PRESSURE: 120 MMHG | HEART RATE: 58 BPM

## 2017-07-20 VITALS
DIASTOLIC BLOOD PRESSURE: 64 MMHG | HEART RATE: 72 BPM | OXYGEN SATURATION: 94 % | SYSTOLIC BLOOD PRESSURE: 119 MMHG | TEMPERATURE: 98.06 F

## 2017-07-20 VITALS
SYSTOLIC BLOOD PRESSURE: 132 MMHG | TEMPERATURE: 98.42 F | OXYGEN SATURATION: 95 % | DIASTOLIC BLOOD PRESSURE: 72 MMHG | HEART RATE: 63 BPM

## 2017-07-20 VITALS — OXYGEN SATURATION: 93 %

## 2017-07-20 LAB
BASOPHILS # BLD: 0.03 K/UL (ref 0–0.2)
BASOPHILS NFR BLD: 0.6 %
COMPLETE: YES
EOSINOPHIL NFR BLD AUTO: 238 K/UL (ref 130–400)
HCT VFR BLD CALC: 34 % (ref 37–47)
IG%: 0.4 %
IMM GRANULOCYTES NFR BLD AUTO: 24.3 %
LYMPHOCYTES # BLD: 1.28 K/UL (ref 1.2–3.4)
MCH RBC QN AUTO: 29.9 PG (ref 25–34)
MCHC RBC AUTO-ENTMCNC: 32.9 G/DL (ref 32–36)
MCV RBC AUTO: 90.7 FL (ref 80–100)
MONOCYTES NFR BLD: 8 %
NEUTROPHILS # BLD AUTO: 6.1 %
NEUTROPHILS NFR BLD AUTO: 60.6 %
PARTIAL THROMBOPLASTIN RATIO: 1.5
PARTIAL THROMBOPLASTIN RATIO: 2.2
PMV BLD AUTO: 10.3 FL (ref 7.4–10.4)
RBC # BLD AUTO: 3.75 M/UL (ref 4.2–5.4)
WBC # BLD AUTO: 5.26 K/UL (ref 4.8–10.8)

## 2017-07-20 RX ADMIN — RANITIDINE SCH MG: 150 TABLET ORAL at 07:40

## 2017-07-20 RX ADMIN — HEPARIN SODIUM PRN MLS/HR: 1000 INJECTION, SOLUTION INTRAVENOUS; SUBCUTANEOUS at 15:39

## 2017-07-20 RX ADMIN — ACETAMINOPHEN PRN MG: 325 TABLET ORAL at 03:22

## 2017-07-20 RX ADMIN — FENOFIBRATE SCH MG: 145 TABLET, FILM COATED ORAL at 07:40

## 2017-07-20 RX ADMIN — RANITIDINE SCH MG: 150 TABLET ORAL at 21:28

## 2017-07-20 RX ADMIN — LEVOTHYROXINE SODIUM SCH MCG: 112 TABLET ORAL at 05:37

## 2017-07-20 NOTE — CLINICAL DOCUMENTATION QUERY
ANKIT Howe :



**** CLINICAL DOCUMENTATION QUERY****



Clinical documentation includes a diagnosis of: Acute Respiratory Failure.

Due to stringent requirements by our coding department, multiple clinical indicators 
associated with this diagnosis must be present in order for this to be coded/captured 
within the medical record. If appropriate, please document 2 or more of the following 
clinical indicators in daily progress notes and the discharge summary. If you feel the 
diagnosis of acute respiratory failure was made in error, or do not agree with it, simply 
discontinue documentation thereof.

Acute Respiratory Failure indicators include:



* Respirations >28

* Air hunger

* Use of accessory muscles of respiration

* Inability to speak in full sentences

* Cyanosis

* Pulse ox <90% RA or <95% on O2

*pH <7.35 or >7.45

* pO2 < 60 mm Hg (or 10mm below COPD patient's baseline)

* pCO2 >50mm Hg (or 10mm above COPD patient's baseline)

* mechanical ventilation

* Increased work of breathing

* Tachypnea



Please clarify and document your clinical opinion in the progress notes and discharge 
summary. Terms such as "probable", "suspected", "likely", "questionable", "possible", or 
"still to be ruled out" are acceptable. 



*****IF IN AGREEMENT, YOU MUST DOCUMENT ABOVE DIAGNOSTIC STATEMENT IN DAILY PROGRESS NOTES 
AND DISCHARGE SUMMARY. This document is not part of the patient's record.*****

Thank You, Salomon Artis, OSCAR [phone]

## 2017-07-20 NOTE — PULMONOLOGY PROGRESS NOTE
Pulmonary Progress Note


Date of Service


Jul 20, 2017.





Attending


Dr. Cary





Subjective


Patient seen and examined at bedside.


She denies any chest pain, cough, or hemoptysis.


Still having some left lower extremity pain at ankle.


Otherwise she is feeling better.





Objective


Vitals reviewed.  Afebrile, P 64, /78, 92% on 4L NC.


Head:  normocephalic, atraumatic


Skin: No skin rashes 


Eyes:  normal inspection, EOMI, sclerae normal


ENT:  normal ENT inspection, pharynx normal


Neck:  supple, no adenopathy, trachea midline


Respiratory/Chest: Good air entry bilaterally, no crackles or wheezes


Cardiovascular:  regular rate, rhythm, no edema, no murmur


Abdomen / GI:  normal bowel sounds, non tender, obese 


Back: Intact 


Extremities: Left leg in cast, right lower extremity with compression stockings

, No cyanosis or clubbing bilaterally.


Neurologic/Psych:  no motor/sensory deficits, awake alert oriented 3





TTE from 07/19/2017


  -- Conclusions --


  *  Ejection Fraction = 60-65%.


  *  The right ventricle is not well visualized.


  *  The right ventricle appears mildly dialted in limited views.


  *  The right ventricular function is qualitatively normal in limited views.


  *  There is mild tricuspid regurgitation.


  *  The estimated systolic PAP is 60mmhg.





Chest x-ray from 07/19/2017


FINDINGS:


Cardiomediastinal silhouette normal. Minimal left apical opacity is better


appreciated on subsequent CT. Pleural spaces clear. Osseous structures and upper


abdomen normal.





IMPRESSION:


1.  Minimal left apical opacity better appreciated on subsequent CT.





CT chest from 07/19/2017





1. Extensive bilateral pulmonary emboli bilaterally as above. Straightening of


the intraventricular septum raises the concern for possible associated right


heart strain.


2. Focal groundglass opacity of the left upper lobe may reflect atelectasis,


pneumonitis or small pulmonary infarction.


3. Diffuse fatty infiltration of the liver.


4. Chronic compression deformity of T12.





Lower extremity U/S 7/19/2017





On the right, no intraluminal thrombus was visualized. The veins are fully


compressible from the groin through the popliteal vein. There is normal


color-flow the proximal trifurcation veins of the right calf.





In the left, no thrombus is visualized in the left common femoral or superficial


femoral veins. There is thrombus within the left popliteal vein which appears


acute. There is left peroneal vein thrombus.





IMPRESSION: Acute left lower extremity DVT with involvement of the popliteal and


peroneal veins





Assessment & Plan


Bilateral pulmonary emboli


Hypoxemia


Left lower extremity DVT





Continue with full dose anticoagulation for at least 3 months for provoked VTE. 

She states that she is on awaiting insurance response to see whether she will 

be approved on NOAC versus Coumadin. Continue with the heparin drip for now.


She is currently on 4L nasal cannula titrate as tolerated.


Ensure adequate pain control and give incentive spirometry to prevent 

atelectasis.


Hypercoagulable work up pending.


I appreciate the consult.  Will sign of case.  Please contact me if you've any 

further questions or concerns.





Data


Medications:





Current Inpatient Medications








 Medications


  (Trade)  Dose


 Ordered  Sig/Mora


 Route  Start Time


 Stop Time Status Last Admin


Dose Admin


 


 Ioversol


  (Optiray 320)  100 ml  UD  PRN


 IV  7/19/17 00:15


 7/23/17 00:14   


 


 


 Acetaminophen


  (Tylenol Tab)  650 mg  Q4H  PRN


 PO  7/19/17 02:30


 8/18/17 02:29  7/20/17 03:22


650 MG


 


 Hydromorphone HCl


  (Dilaudid Inj)  0.5 mg  Q3H  PRN


 IV  7/19/17 02:30


 8/2/17 02:29   


 


 


 Ondansetron HCl


  (Zofran Inj)  4 mg  Q6H  PRN


 IV  7/19/17 02:30


 8/18/17 02:29   


 


 


 Atenolol


  (Tenormin Tab)  50 mg  QAM


 PO  7/20/17 09:00


 8/19/17 08:59  7/20/17 07:40


50 MG


 


 Hydroxyzine HCl


  (Vistaril Tab)  25 mg  QAM


 PO  7/19/17 09:00


 8/18/17 08:59  7/20/17 07:40


25 MG


 


 Levothyroxine


 Sodium


  (Synthroid Tab)  112 mcg  DAILYBB


 PO  7/19/17 06:00


 8/18/17 05:59  7/20/17 05:37


112 MCG


 


 Ranitidine HCl


  (zANTac TAB)  150 mg  BID


 PO  7/19/17 09:00


 8/18/17 08:59  7/20/17 07:40


150 MG


 


 Fenofibrate


  (Tricor Tab)  145 mg  QAM


 PO  7/19/17 09:00


 8/18/17 08:59  7/20/17 07:40


145 MG


 


 Acetaminophen/


 Hydrocodone Bitart


  (Norco 5/325 Tab)  1 tab  Q4H  PRN


 PO  7/19/17 02:30


 8/2/17 02:29   


 


 


 Albuterol/


 Ipratropium


  (Duoneb)  3 ml  Q2H  PRN


 INH  7/19/17 02:30


 8/18/17 02:29   


 


 


 Heparin Sodium


  (Porcine) 65224


 unit/Dextrose  500 ml @ 


 32 mls/hr  [id number] PRN


 IV  7/19/17 03:15


 8/18/17 03:14  7/19/17 21:45


26 MLS/HR








Vital Signs:











  Date Time  Temp Pulse Resp B/P (MAP) Pulse Ox O2 Delivery O2 Flow Rate FiO2


 


7/20/17 12:13     93 Nasal Cannula 4.0 


 


7/20/17 11:26 37.1 62 18 110/62 (78) 93 Nasal Cannula 4.0 


 


7/20/17 08:01     93 Nasal Cannula 4.0 


 


7/20/17 06:58 36.7 58 18 110/66 (81) 93 Nasal Cannula 4.0 


 


7/20/17 04:09 36.7 58 20 120/70 (87) 95 Nasal Cannula 5.0 


 


7/20/17 04:00      Nasal Cannula 4.0 


 


7/19/17 23:59      Nasal Cannula 4.0 


 


7/19/17 23:51 36.5 57 20 118/73 (88) 96 Nasal Cannula 4.0 


 


7/19/17 20:00      Nasal Cannula 4.0 


 


7/19/17 18:46 37.0 64 17 122/76 (91) 94 Nasal Cannula 4.0 


 


7/19/17 16:00      Nasal Cannula 4.0 


 


7/19/17 15:19 36.7 58 19 124/74 (91) 94 Nasal Cannula 4.0 








Laboratory Results:





Last 24 Hours








Test


  7/20/17


06:52


 


White Blood Count 5.26 K/uL 


 


Red Blood Count 3.75 M/uL 


 


Hemoglobin 11.2 g/dL 


 


Hematocrit 34.0 % 


 


Mean Corpuscular Volume 90.7 fL 


 


Mean Corpuscular Hemoglobin 29.9 pg 


 


Mean Corpuscular Hemoglobin


Concent 32.9 g/dl 


 


 


Platelet Count 238 K/uL 


 


Mean Platelet Volume 10.3 fL 


 


Neutrophils (%) (Auto) 60.6 % 


 


Lymphocytes (%) (Auto) 24.3 % 


 


Monocytes (%) (Auto) 8.0 % 


 


Eosinophils (%) (Auto) 6.1 % 


 


Basophils (%) (Auto) 0.6 % 


 


Neutrophils # (Auto) 3.19 K/uL 


 


Lymphocytes # (Auto) 1.28 K/uL 


 


Monocytes # (Auto) 0.42 K/uL 


 


Eosinophils # (Auto) 0.32 K/uL 


 


Basophils # (Auto) 0.03 K/uL 


 


RDW Standard Deviation 42.6 fL 


 


RDW Coefficient of Variation 12.9 % 


 


Immature Granulocyte % (Auto) 0.4 % 


 


Immature Granulocyte # (Auto) 0.02 K/uL 


 


Activated Partial


Thromboplast Time 38.8 SECONDS 


 


 


Partial Thromboplastin Ratio 1.5

## 2017-07-20 NOTE — PROGRESS NOTE
Medicine Progress Note


Date & Time of Visit:


Jul 20, 2017 at 16:57.


Subjective


Pt was seen and examined


Sitting at the edge of the bed with  at bedside


Pt said that she feels fine 


she said that the tenderness across her chest improved


she said that her breathing seems to improved


denies any hematuria, chest pain, palpitation, SOB, blood in stools





Objective





Last 8 Hrs








  Date Time  Temp Pulse Resp B/P (MAP) Pulse Ox O2 Delivery O2 Flow Rate FiO2


 


7/20/17 16:00      Nasal Cannula 4.0 


 


7/20/17 12:13     93 Nasal Cannula 4.0 


 


7/20/17 11:26 37.1 62 18 110/62 (78) 93 Nasal Cannula 4.0 








Physical Exam:


General- No acute distress


Head-  atraumatic


Eyes- PERRL, EOMI


ENT- oropharynx clear


Neck- supple, no JVD


Lungs- clear to auscultation


Heart- regular rhythm; no murmur


Abdomen- normal bowel sounds, soft


Extremities- no calf tenderness, left fracture boot in place


Neuro- alert, oriented x 3; PERRL, EOMI


Skin- warm & dry


Laboratory Results:





Last 24 Hours








Test


  7/20/17


06:52 7/20/17


15:14


 


White Blood Count 5.26 K/uL  


 


Red Blood Count 3.75 M/uL  


 


Hemoglobin 11.2 g/dL  


 


Hematocrit 34.0 %  


 


Mean Corpuscular Volume 90.7 fL  


 


Mean Corpuscular Hemoglobin 29.9 pg  


 


Mean Corpuscular Hemoglobin


Concent 32.9 g/dl 


  


 


 


Platelet Count 238 K/uL  


 


Mean Platelet Volume 10.3 fL  


 


Neutrophils (%) (Auto) 60.6 %  


 


Lymphocytes (%) (Auto) 24.3 %  


 


Monocytes (%) (Auto) 8.0 %  


 


Eosinophils (%) (Auto) 6.1 %  


 


Basophils (%) (Auto) 0.6 %  


 


Neutrophils # (Auto) 3.19 K/uL  


 


Lymphocytes # (Auto) 1.28 K/uL  


 


Monocytes # (Auto) 0.42 K/uL  


 


Eosinophils # (Auto) 0.32 K/uL  


 


Basophils # (Auto) 0.03 K/uL  


 


RDW Standard Deviation 42.6 fL  


 


RDW Coefficient of Variation 12.9 %  


 


Immature Granulocyte % (Auto) 0.4 %  


 


Immature Granulocyte # (Auto) 0.02 K/uL  


 


Activated Partial


Thromboplast Time 38.8 SECONDS 


  57.1 SECONDS 


 


 


Partial Thromboplastin Ratio 1.5  2.2 











Assessment & Plan


Acute B/L PE


Acute LLE  DVT


Present with chest pain and hypoxia on admission 


Provoked by recent orthopedic surgery in the LLE


LE venous Doppler showed acute left lower extremity DVT with involvement of the 

popliteal and peroneal veins


CTA chest showed Extensive bilateral pulmonary emboli bilaterally associated 

right heart strain.


On heparin drip


Continue oxygen supplement


will need to be anticoagulate for at least 3 months


Discussed with pt about anticoagulant with Coumadin vs NOAC 


discussed about bleeding risks


Pt will decide tomorrow about anticoagulant


Continue incentive spirometry


hypercoagulable workup sent


Pulmonary on board


7/20


continue heparin drip


waiting for insurance to check if NOAC will be covered


Will wean her off oxygen slowly


Will discharge home tomorrow


Echo done showed 


  *  Ejection Fraction = 60-65%.


  *  The right ventricle is not well visualized.


  *  The right ventricle appears mildly dialted in limited views.


  *  The right ventricular function is qualitatively normal in limited views.


  *  There is mild tricuspid regurgitation.


  *  The estimated systolic PAP is 60mmhg.





S/P Left Ankle Surgery


Failed ASA thrombo-embolic prophylaxis


Ortho on board


Plan to removed staple on tomorrow 


Continue fracture boot during transportation


continue PT/OT





Hypertension


Stable





DVT px on heparin subq





CODE STATUS FULL CODE





DISPOSITION


Discharge home tomorrow on anticoagulant


Consultants:


Pulmonary


Ortho


Current Inpatient Medications:





Current Inpatient Medications








 Medications


  (Trade)  Dose


 Ordered  Sig/Mora


 Route  Start Time


 Stop Time Status Last Admin


Dose Admin


 


 Ioversol


  (Optiray 320)  100 ml  UD  PRN


 IV  7/19/17 00:15


 7/23/17 00:14   


 


 


 Acetaminophen


  (Tylenol Tab)  650 mg  Q4H  PRN


 PO  7/19/17 02:30


 8/18/17 02:29  7/20/17 03:22


650 MG


 


 Hydromorphone HCl


  (Dilaudid Inj)  0.5 mg  Q3H  PRN


 IV  7/19/17 02:30


 8/2/17 02:29   


 


 


 Ondansetron HCl


  (Zofran Inj)  4 mg  Q6H  PRN


 IV  7/19/17 02:30


 8/18/17 02:29   


 


 


 Atenolol


  (Tenormin Tab)  50 mg  QAM


 PO  7/20/17 09:00


 8/19/17 08:59  7/20/17 07:40


50 MG


 


 Hydroxyzine HCl


  (Vistaril Tab)  25 mg  QAM


 PO  7/19/17 09:00


 8/18/17 08:59  7/20/17 07:40


25 MG


 


 Levothyroxine


 Sodium


  (Synthroid Tab)  112 mcg  DAILYBB


 PO  7/19/17 06:00


 8/18/17 05:59  7/20/17 05:37


112 MCG


 


 Ranitidine HCl


  (zANTac TAB)  150 mg  BID


 PO  7/19/17 09:00


 8/18/17 08:59  7/20/17 07:40


150 MG


 


 Fenofibrate


  (Tricor Tab)  145 mg  QAM


 PO  7/19/17 09:00


 8/18/17 08:59  7/20/17 07:40


145 MG


 


 Acetaminophen/


 Hydrocodone Bitart


  (Norco 5/325 Tab)  1 tab  Q4H  PRN


 PO  7/19/17 02:30


 8/2/17 02:29   


 


 


 Albuterol/


 Ipratropium


  (Duoneb)  3 ml  Q2H  PRN


 INH  7/19/17 02:30


 8/18/17 02:29   


 


 


 Heparin Sodium


  (Porcine) 85854


 unit/Dextrose  500 ml @ 


 32 mls/hr  [id number] PRN


 IV  7/19/17 03:15


 8/18/17 03:14  7/20/17 15:39


32 MLS/HR

## 2017-07-21 VITALS
DIASTOLIC BLOOD PRESSURE: 62 MMHG | TEMPERATURE: 98.24 F | HEART RATE: 70 BPM | OXYGEN SATURATION: 90 % | SYSTOLIC BLOOD PRESSURE: 138 MMHG

## 2017-07-21 VITALS
OXYGEN SATURATION: 90 % | SYSTOLIC BLOOD PRESSURE: 143 MMHG | DIASTOLIC BLOOD PRESSURE: 69 MMHG | TEMPERATURE: 98.96 F | HEART RATE: 74 BPM

## 2017-07-21 VITALS
OXYGEN SATURATION: 91 % | HEART RATE: 88 BPM | SYSTOLIC BLOOD PRESSURE: 144 MMHG | DIASTOLIC BLOOD PRESSURE: 77 MMHG | TEMPERATURE: 98.42 F

## 2017-07-21 VITALS
OXYGEN SATURATION: 95 % | TEMPERATURE: 98.6 F | HEART RATE: 67 BPM | DIASTOLIC BLOOD PRESSURE: 63 MMHG | SYSTOLIC BLOOD PRESSURE: 119 MMHG

## 2017-07-21 VITALS
TEMPERATURE: 98.24 F | HEART RATE: 64 BPM | DIASTOLIC BLOOD PRESSURE: 80 MMHG | SYSTOLIC BLOOD PRESSURE: 133 MMHG | OXYGEN SATURATION: 93 %

## 2017-07-21 VITALS — OXYGEN SATURATION: 93 %

## 2017-07-21 VITALS
TEMPERATURE: 98.24 F | OXYGEN SATURATION: 90 % | HEART RATE: 70 BPM | SYSTOLIC BLOOD PRESSURE: 138 MMHG | DIASTOLIC BLOOD PRESSURE: 62 MMHG

## 2017-07-21 LAB
BASOPHILS # BLD: 0.01 K/UL (ref 0–0.2)
BASOPHILS NFR BLD: 0.2 %
COMPLETE: YES
EOSINOPHIL NFR BLD AUTO: 271 K/UL (ref 130–400)
HCT VFR BLD CALC: 38.3 % (ref 37–47)
IG%: 0.6 %
IMM GRANULOCYTES NFR BLD AUTO: 21.2 %
INR PPP: 1.1 (ref 0.9–1.1)
LYMPHOCYTES # BLD: 1.07 K/UL (ref 1.2–3.4)
MCH RBC QN AUTO: 27.6 PG (ref 25–34)
MCHC RBC AUTO-ENTMCNC: 30.8 G/DL (ref 32–36)
MCV RBC AUTO: 89.5 FL (ref 80–100)
MONOCYTES NFR BLD: 6.9 %
NEUTROPHILS # BLD AUTO: 4.6 %
NEUTROPHILS NFR BLD AUTO: 66.5 %
PARTIAL THROMBOPLASTIN RATIO: 1.9
PMV BLD AUTO: 10.6 FL (ref 7.4–10.4)
PROTHROMBIN TIME: 12 SECONDS (ref 9–12)
RBC # BLD AUTO: 4.28 M/UL (ref 4.2–5.4)
WBC # BLD AUTO: 5.05 K/UL (ref 4.8–10.8)

## 2017-07-21 RX ADMIN — LEVOTHYROXINE SODIUM SCH MCG: 112 TABLET ORAL at 04:52

## 2017-07-21 RX ADMIN — FENOFIBRATE SCH MG: 145 TABLET, FILM COATED ORAL at 06:55

## 2017-07-21 RX ADMIN — RANITIDINE SCH MG: 150 TABLET ORAL at 21:02

## 2017-07-21 RX ADMIN — HEPARIN SODIUM PRN MLS/HR: 1000 INJECTION, SOLUTION INTRAVENOUS; SUBCUTANEOUS at 21:01

## 2017-07-21 RX ADMIN — HEPARIN SODIUM PRN MLS/HR: 1000 INJECTION, SOLUTION INTRAVENOUS; SUBCUTANEOUS at 04:56

## 2017-07-21 RX ADMIN — RANITIDINE SCH MG: 150 TABLET ORAL at 06:55

## 2017-07-21 RX ADMIN — WARFARIN SODIUM SCH MG: 5 TABLET ORAL at 16:32

## 2017-07-21 NOTE — CONSULTANT RECOMMENDATIONS
Consultant Recommendations


Date of Service


Jul 21, 2017.





Consultant Recommendations


Orthopedic Discharge Instructions:





-Continue nonweightbearing left lower extremity


-Do not submerge in a bath, it may get wet in a shower but do not scrub directly

, pat dry afterwards and keep covered.


-The boot may be removed to perform PT and range of motion exercises, otherwise 

the boot must be on with sleeping and when upright ambulating/transferring 


-Continue to elevate for swelling control


-Please call [facility] to reschedule an appointment with Dr. Paez for 4 weeks, new xrays will be obtained at that appointment.  


   -[phone]


-If you notice any changes at your incisions, such as increased drainage, 

swelling, redness, or pain, please notify our office accordingly


-Please continue your medicine provided by your providers from [facility] to treat your pulmonary emboli.

## 2017-07-21 NOTE — PROGRESS NOTE
Orthopedic SOAP Note


Subjective


Date of Service:


Jul 21, 2017.


Post OP Day:  11


Reports: feeling well, pain controlled w PO medications, Denies: complaints, 

chest pain, SOB, nausea / vomiting, light headedness, calf pain, using PCA


Additional Notes:


reports SOB and chest tightness much improved.  


ankle pain s/p ORIF by Dr. Paez on 7/10/17 improving as well





Problem List


Medical Problems:


(1) Trimalleolar fracture of ankle, closed


Status: Acute  











Objective


calves soft nontender, N/V intact, capillary refill less than 2 sec., dressing C

/D/I, incision C/D/I (staples removed today under sterile conditions, benzoin 

tincture and steri-strips applied, incision is without erythema or drainage), A&

O x3, toes mobile











  Date Time  Temp Pulse Resp B/P (MAP) Pulse Ox O2 Delivery O2 Flow Rate FiO2


 


7/21/17 08:24     93 Room Air  


 


7/21/17 07:06 36.8 64 21 133/80 (97) 93 Room Air  


 


7/21/17 04:00      Nasal Cannula 1.0 


 


7/21/17 03:58 37.0 67 19 119/63 (81) 95 Nasal Cannula 2.0 


 


7/21/17 00:00      Nasal Cannula 1.0 


 


7/20/17 22:57 36.9 63 19 132/72 (92) 95 Nasal Cannula 2.0 


 


7/20/17 20:00      Nasal Cannula 4.0 


 


7/20/17 18:46 36.7 72 18 119/64 (82) 94 Nasal Cannula 3.0 


 


7/20/17 16:00      Nasal Cannula 4.0 


 


7/20/17 12:13     93 Nasal Cannula 4.0 


 


7/20/17 11:26 37.1 62 18 110/62 (78) 93 Nasal Cannula 4.0 








Laboratory Results 24 Hours:











Test


  7/21/17


06:41


 


White Blood Count 5.05 K/uL 


 


Red Blood Count 4.28 M/uL 


 


Hemoglobin 11.8 g/dL 


 


Hematocrit 38.3 % 


 


Mean Corpuscular Volume 89.5 fL 


 


Mean Corpuscular Hemoglobin 27.6 pg 


 


Mean Corpuscular Hemoglobin


Concent 30.8 g/dl 


 


 


Platelet Count 271 K/uL 


 


Mean Platelet Volume 10.6 fL 


 


Neutrophils (%) (Auto) 66.5 % 


 


Lymphocytes (%) (Auto) 21.2 % 


 


Monocytes (%) (Auto) 6.9 % 


 


Eosinophils (%) (Auto) 4.6 % 


 


Basophils (%) (Auto) 0.2 % 


 


Neutrophils # (Auto) 3.36 K/uL 


 


Lymphocytes # (Auto) 1.07 K/uL 


 


Monocytes # (Auto) 0.35 K/uL 


 


Eosinophils # (Auto) 0.23 K/uL 


 


Basophils # (Auto) 0.01 K/uL 


 


Prothromb Time International


Ratio 1.1 


 


 


Prothrombin Time 12.0 SECONDS 











Assessment


s/p Left ankle ORIF 7/10/17 by Dr. Paez [facility]


Bilateral pulmonary embolism


Left lower extremity DVT





Plan


Continue current orders per Medicine and treatment for PE


Elevate left lower extremity


Please keep incision covered 


Fracture boot on when upright


Nonweightbearing left LE


Possible discharge today


Will follow-up with Dr. Paez at [facility] in 4 weeks


Staples removed today from left ankle

## 2017-07-21 NOTE — PROGRESS NOTE
Medicine Progress Note


Date & Time of Visit:


Jul 21, 2017 at 10:15.


Subjective


Pt was seen and examined


Lying in bed with no distress with  at bedside


Pt said that she feels fine


Denies any chest pain, palpitation, dizziness and SOB





Objective





Last 8 Hrs








  Date Time  Temp Pulse Resp B/P (MAP) Pulse Ox O2 Delivery O2 Flow Rate FiO2


 


7/21/17 08:24     93 Room Air  


 


7/21/17 07:06 36.8 64 21 133/80 (97) 93 Room Air  


 


7/21/17 04:00      Nasal Cannula 1.0 


 


7/21/17 03:58 37.0 67 19 119/63 (81) 95 Nasal Cannula 2.0 








Physical Exam:


General- No acute distress


Head-  atraumatic


Eyes- PERRL, EOMI


ENT- oropharynx clear


Neck- supple, no JVD


Lungs- clear to auscultation


Heart- regular rhythm; no murmur


Abdomen- normal bowel sounds, soft


Extremities- no calf tenderness, left fracture boot in place


Neuro- alert, oriented x 3; PERRL, EOMI


Skin- warm & dry


Laboratory Results:





Last 24 Hours








Test


  7/20/17


15:14 7/21/17


06:41


 


Activated Partial


Thromboplast Time 57.1 SECONDS 


  49.2 SECONDS 


 


 


Partial Thromboplastin Ratio 2.2  1.9 


 


White Blood Count  5.05 K/uL 


 


Red Blood Count  4.28 M/uL 


 


Hemoglobin  11.8 g/dL 


 


Hematocrit  38.3 % 


 


Mean Corpuscular Volume  89.5 fL 


 


Mean Corpuscular Hemoglobin  27.6 pg 


 


Mean Corpuscular Hemoglobin


Concent 


  30.8 g/dl 


 


 


Platelet Count  271 K/uL 


 


Mean Platelet Volume  10.6 fL 


 


Neutrophils (%) (Auto)  66.5 % 


 


Lymphocytes (%) (Auto)  21.2 % 


 


Monocytes (%) (Auto)  6.9 % 


 


Eosinophils (%) (Auto)  4.6 % 


 


Basophils (%) (Auto)  0.2 % 


 


Neutrophils # (Auto)  3.36 K/uL 


 


Lymphocytes # (Auto)  1.07 K/uL 


 


Monocytes # (Auto)  0.35 K/uL 


 


Eosinophils # (Auto)  0.23 K/uL 


 


Basophils # (Auto)  0.01 K/uL 


 


RDW Standard Deviation  41.8 fL 


 


RDW Coefficient of Variation  12.9 % 


 


Immature Granulocyte % (Auto)  0.6 % 


 


Immature Granulocyte # (Auto)  0.03 K/uL 


 


Prothrombin Time  12.0 SECONDS 


 


Prothromb Time International


Ratio 


  1.1 


 


 


Hepatitis C Antibody  NEG 











Assessment & Plan


Acute B/L PE


Acute LLE  DVT


Present with chest pain and hypoxia on admission 


Provoked by recent orthopedic surgery in the LLE


LE venous Doppler showed acute left lower extremity DVT with involvement of the 

popliteal and peroneal veins


CTA chest showed Extensive bilateral pulmonary emboli bilaterally associated 

right heart strain.


On heparin drip


Continue oxygen supplement


will need to be anticoagulate for at least 3 months


Discussed with pt about anticoagulant with Coumadin vs NOAC 


discussed about bleeding risks


Pt will decide tomorrow about anticoagulant


Continue incentive spirometry


hypercoagulable workup sent


Pulmonary on board


7/20


continue heparin drip


waiting for insurance to check if NOAC will be covered


Will wean her off oxygen slowly


Will discharge home tomorrow


7/21


On heparin drip, will switch to lovenox


 checked for price, NOAC will be too expensive


will do coumadin and bridge with lovenox


1st dose of coumadin given yesterday


She will need to follow with the coag clinic


INR goal 2-3


Insurance will not cover for the lovenox ( it will cost pt about $700 for 7-10 

days for the lovenox)


Will keep pt in the hospital with the heparin drip until INR is therapeutic.


Will give Coumadin 5 mg today


Echo done showed 


  *  Ejection Fraction = 60-65%.


  *  The right ventricle is not well visualized.


  *  The right ventricle appears mildly dialted in limited views.


  *  The right ventricular function is qualitatively normal in limited views.


  *  There is mild tricuspid regurgitation.


  *  The estimated systolic PAP is 60mmhg.





S/P Left Ankle Surgery


Failed ASA thrombo-embolic prophylaxis


Ortho on board


Stable removal done today by Ortho


Continue fracture boot during transportation


continue PT/OT


Orthopedic Discharge Instructions:


-Continue nonweightbearing left lower extremity


-Do not submerge in a bath, it may get wet in a shower but do not scrub directly

, pat dry afterwards and keep covered.


-The boot may be removed to perform PT and range of motion exercises, otherwise 

the boot must be on with sleeping and when upright ambulating/transferring 


-Continue to elevate for swelling control


-Please call [facility] to reschedule an appointment with Dr. Paez for 4 weeks, new xrays will be obtained at that appointment.  [phone]


-If you notice any changes at your incisions, such as increased drainage, 

swelling, redness, or pain, please notify our office accordingly





Hypertension


Stable





DVT px on heparin subq





CODE STATUS FULL CODE





DISPOSITION


Discharge home today with Lovenox and coumadin


Consultants:


Pulmonary


Ortho


Current Inpatient Medications:





Current Inpatient Medications








 Medications


  (Trade)  Dose


 Ordered  Sig/Mora


 Route  Start Time


 Stop Time Status Last Admin


Dose Admin


 


 Ioversol


  (Optiray 320)  100 ml  UD  PRN


 IV  7/19/17 00:15


 7/23/17 00:14   


 


 


 Acetaminophen


  (Tylenol Tab)  650 mg  Q4H  PRN


 PO  7/19/17 02:30


 8/18/17 02:29  7/20/17 03:22


650 MG


 


 Hydromorphone HCl


  (Dilaudid Inj)  0.5 mg  Q3H  PRN


 IV  7/19/17 02:30


 8/2/17 02:29   


 


 


 Ondansetron HCl


  (Zofran Inj)  4 mg  Q6H  PRN


 IV  7/19/17 02:30


 8/18/17 02:29   


 


 


 Atenolol


  (Tenormin Tab)  50 mg  QAM


 PO  7/20/17 09:00


 8/19/17 08:59  7/21/17 06:55


50 MG


 


 Hydroxyzine HCl


  (Vistaril Tab)  25 mg  QAM


 PO  7/19/17 09:00


 8/18/17 08:59  7/21/17 06:55


25 MG


 


 Levothyroxine


 Sodium


  (Synthroid Tab)  112 mcg  DAILYBB


 PO  7/19/17 06:00


 8/18/17 05:59  7/21/17 04:52


112 MCG


 


 Ranitidine HCl


  (zANTac TAB)  150 mg  BID


 PO  7/19/17 09:00


 8/18/17 08:59  7/21/17 06:55


150 MG


 


 Fenofibrate


  (Tricor Tab)  145 mg  QAM


 PO  7/19/17 09:00


 8/18/17 08:59  7/21/17 06:55


145 MG


 


 Acetaminophen/


 Hydrocodone Bitart


  (Norco 5/325 Tab)  1 tab  Q4H  PRN


 PO  7/19/17 02:30


 8/2/17 02:29   


 


 


 Albuterol/


 Ipratropium


  (Duoneb)  3 ml  Q2H  PRN


 INH  7/19/17 02:30


 8/18/17 02:29   


 


 


 Heparin Sodium


  (Porcine) 90255


 unit/Dextrose  500 ml @ 


 32 mls/hr  [id number] PRN


 IV  7/19/17 03:15


 8/18/17 03:14  7/21/17 04:56


32 MLS/HR


 


 Warfarin Sodium


  (Coumadin Tab)  5 mg  DAILY@16


 PO  7/21/17 16:00


 8/20/17 15:59

## 2017-07-22 VITALS
HEART RATE: 68 BPM | DIASTOLIC BLOOD PRESSURE: 71 MMHG | OXYGEN SATURATION: 94 % | TEMPERATURE: 98.24 F | SYSTOLIC BLOOD PRESSURE: 125 MMHG

## 2017-07-22 VITALS
SYSTOLIC BLOOD PRESSURE: 121 MMHG | DIASTOLIC BLOOD PRESSURE: 74 MMHG | TEMPERATURE: 97.88 F | HEART RATE: 66 BPM | OXYGEN SATURATION: 92 %

## 2017-07-22 VITALS — OXYGEN SATURATION: 94 %

## 2017-07-22 LAB
BASOPHILS # BLD: 0.03 K/UL (ref 0–0.2)
BASOPHILS NFR BLD: 0.7 %
COMPLETE: YES
EOSINOPHIL NFR BLD AUTO: 253 K/UL (ref 130–400)
HCT VFR BLD CALC: 36.3 % (ref 37–47)
IG%: 0.4 %
IMM GRANULOCYTES NFR BLD AUTO: 29.4 %
INR PPP: 1.7 (ref 0.9–1.1)
LYMPHOCYTES # BLD: 1.32 K/UL (ref 1.2–3.4)
MCH RBC QN AUTO: 29.6 PG (ref 25–34)
MCHC RBC AUTO-ENTMCNC: 33.1 G/DL (ref 32–36)
MCV RBC AUTO: 89.4 FL (ref 80–100)
MONOCYTES NFR BLD: 7.8 %
NEUTROPHILS # BLD AUTO: 5.1 %
NEUTROPHILS NFR BLD AUTO: 56.6 %
PARTIAL THROMBOPLASTIN RATIO: 2
PMV BLD AUTO: 10.4 FL (ref 7.4–10.4)
PROTHROMBIN TIME: 18.9 SECONDS (ref 9–12)
RBC # BLD AUTO: 4.06 M/UL (ref 4.2–5.4)
WBC # BLD AUTO: 4.49 K/UL (ref 4.8–10.8)

## 2017-07-22 RX ADMIN — ACETAMINOPHEN PRN MG: 325 TABLET ORAL at 12:37

## 2017-07-22 RX ADMIN — RANITIDINE SCH MG: 150 TABLET ORAL at 20:33

## 2017-07-22 RX ADMIN — WARFARIN SODIUM SCH MG: 5 TABLET ORAL at 16:54

## 2017-07-22 RX ADMIN — LEVOTHYROXINE SODIUM SCH MCG: 112 TABLET ORAL at 05:51

## 2017-07-22 RX ADMIN — RANITIDINE SCH MG: 150 TABLET ORAL at 08:01

## 2017-07-22 RX ADMIN — ACETAMINOPHEN PRN MG: 325 TABLET ORAL at 02:54

## 2017-07-22 RX ADMIN — HEPARIN SODIUM PRN MLS/HR: 1000 INJECTION, SOLUTION INTRAVENOUS; SUBCUTANEOUS at 14:16

## 2017-07-22 RX ADMIN — ACETAMINOPHEN PRN MG: 325 TABLET ORAL at 07:23

## 2017-07-22 RX ADMIN — FENOFIBRATE SCH MG: 145 TABLET, FILM COATED ORAL at 08:01

## 2017-07-22 NOTE — PROGRESS NOTE
Internal Med Progress Note


Date of Service:


Jul 22, 2017.


Provider Documentation:





SUBJECTIVE:





resting comfortably


ambulating fine


afebrile


no sob


no chest pain


denies any pain








OBJECTIVE:





Vital Signs-as noted below





Exam:


General-alert and oriented. Not in distress


ENT-normal hearing


Neck-no neck masses


Lungs-cta b/l no wheezing or crackles


Heart-s1 and s2 heard, regular rate and rhythm, no murmurs


Abdomen-soft bowel sounds present non tender no distension 


Extremities- no erythema 


Neuro-alert and oriented


moves extremities





Lab data as noted below.


ASSESSMENT & PLAN:


Acute B/L PE


Acute LLE  DVT


Present with chest pain and hypoxia on admission 


Provoked by recent orthopedic surgery in the LLE


LE venous Doppler showed acute left lower extremity DVT with involvement of the 

popliteal and peroneal veins


CTA chest showed Extensive bilateral pulmonary emboli bilaterally associated 

right heart strain.


On heparin drip


will need to be anticoagulate for at least 3 months


Echo shows   The estimated systolic PAP is 60mmhg.


on iv heparin and Coumadin


inr 1.7


await inr to be therapeutic





S/P Left Ankle Surgery


Failed ASA thrombo-embolic prophylaxis


Ortho on board


To continue fracture boot during transportation


continue PT/OT


Orthopedic Discharge Instructions:


-Continue nonweightbearing left lower extremity


-Do not submerge in a bath, it may get wet in a shower but do not scrub directly

, pat dry afterwards and keep covered.


-The boot may be removed to perform PT and range of motion exercises, otherwise 

the boot must be on with sleeping and when upright ambulating/transferring 


-Continue to elevate for swelling control


-Please call [facility] to reschedule an appointment with Dr. Paez for 4 weeks, new xrays will be obtained at that appointment.  [phone]


-If you notice any changes at your incisions, such as increased drainage, 

swelling, redness, or pain, please notify our office accordingly





Hypertension


Stable





DVT px on heparin





CODE STATUS FULL CODE





DISPOSITION


Discharge home when inr therapeutic





Vital Signs:











  Date Time  Temp Pulse Resp B/P (MAP) Pulse Ox O2 Delivery O2 Flow Rate FiO2


 


7/22/17 16:24 36.6 66 18 121/74 (90) 92 Room Air  


 


7/22/17 08:00     94 Room Air  


 


7/22/17 07:40 36.8 68 16 125/71 (89) 94 Room Air  


 


7/22/17 00:00      Room Air  


 


7/21/17 23:09 37.2 74 18 143/69 (93) 90   








Lab Results:





Results Past 24 Hours








Test


  7/22/17


06:47 Range/Units


 


 


White Blood Count 4.49 4.8-10.8  K/uL


 


Red Blood Count 4.06 4.2-5.4  M/uL


 


Hemoglobin 12.0 12.0-16.0  g/dL


 


Hematocrit 36.3 37-47  %


 


Mean Corpuscular Volume 89.4   fL


 


Mean Corpuscular Hemoglobin 29.6 25-34  pg


 


Mean Corpuscular Hemoglobin


Concent 33.1


  32-36  g/dl


 


 


Platelet Count 253 130-400  K/uL


 


Mean Platelet Volume 10.4 7.4-10.4  fL


 


Neutrophils (%) (Auto) 56.6  %


 


Lymphocytes (%) (Auto) 29.4  %


 


Monocytes (%) (Auto) 7.8  %


 


Eosinophils (%) (Auto) 5.1  %


 


Basophils (%) (Auto) 0.7  %


 


Neutrophils # (Auto) 2.54 1.4-6.5  K/uL


 


Lymphocytes # (Auto) 1.32 1.2-3.4  K/uL


 


Monocytes # (Auto) 0.35 0.11-0.59  K/uL


 


Eosinophils # (Auto) 0.23 0-0.5  K/uL


 


Basophils # (Auto) 0.03 0-0.2  K/uL


 


RDW Standard Deviation 41.3 36.4-46.3  fL


 


RDW Coefficient of Variation 12.9 11.5-14.5  %


 


Immature Granulocyte % (Auto) 0.4  %


 


Immature Granulocyte # (Auto) 0.02 0.00-0.02  K/uL


 


Prothrombin Time


  18.9


  9.0-12.0


SECONDS


 


Prothromb Time International


Ratio 1.7


  0.9-1.1  


 


 


Activated Partial


Thromboplast Time 51.0


  21.0-31.0


SECONDS


 


Partial Thromboplastin Ratio 2.0

## 2017-07-23 VITALS
DIASTOLIC BLOOD PRESSURE: 73 MMHG | TEMPERATURE: 98.42 F | OXYGEN SATURATION: 92 % | HEART RATE: 70 BPM | SYSTOLIC BLOOD PRESSURE: 123 MMHG

## 2017-07-23 VITALS
TEMPERATURE: 97.88 F | DIASTOLIC BLOOD PRESSURE: 80 MMHG | OXYGEN SATURATION: 92 % | HEART RATE: 65 BPM | SYSTOLIC BLOOD PRESSURE: 130 MMHG

## 2017-07-23 VITALS
HEART RATE: 71 BPM | TEMPERATURE: 98.6 F | OXYGEN SATURATION: 91 % | DIASTOLIC BLOOD PRESSURE: 73 MMHG | SYSTOLIC BLOOD PRESSURE: 123 MMHG

## 2017-07-23 VITALS — OXYGEN SATURATION: 92 %

## 2017-07-23 VITALS
SYSTOLIC BLOOD PRESSURE: 123 MMHG | OXYGEN SATURATION: 91 % | HEART RATE: 75 BPM | TEMPERATURE: 98.24 F | DIASTOLIC BLOOD PRESSURE: 72 MMHG

## 2017-07-23 LAB
BASOPHILS # BLD: 0.04 K/UL (ref 0–0.2)
BASOPHILS NFR BLD: 0.7 %
COMPLETE: YES
EOSINOPHIL NFR BLD AUTO: 261 K/UL (ref 130–400)
HCT VFR BLD CALC: 38.8 % (ref 37–47)
IG%: 0.6 %
IMM GRANULOCYTES NFR BLD AUTO: 21.3 %
INR PPP: 2.7 (ref 0.9–1.1)
LYMPHOCYTES # BLD: 1.15 K/UL (ref 1.2–3.4)
MCH RBC QN AUTO: 29.5 PG (ref 25–34)
MCHC RBC AUTO-ENTMCNC: 32.2 G/DL (ref 32–36)
MCV RBC AUTO: 91.5 FL (ref 80–100)
MONOCYTES NFR BLD: 7.8 %
NEUTROPHILS # BLD AUTO: 4.3 %
NEUTROPHILS NFR BLD AUTO: 65.3 %
PARTIAL THROMBOPLASTIN RATIO: 2.3
PMV BLD AUTO: 11 FL (ref 7.4–10.4)
PROTHROMBIN TIME: 29.7 SECONDS (ref 9–12)
RBC # BLD AUTO: 4.24 M/UL (ref 4.2–5.4)
WBC # BLD AUTO: 5.39 K/UL (ref 4.8–10.8)

## 2017-07-23 RX ADMIN — RANITIDINE SCH MG: 150 TABLET ORAL at 21:02

## 2017-07-23 RX ADMIN — WARFARIN SODIUM SCH MG: 5 TABLET ORAL at 15:36

## 2017-07-23 RX ADMIN — ACETAMINOPHEN PRN MG: 325 TABLET ORAL at 21:02

## 2017-07-23 RX ADMIN — HEPARIN SODIUM PRN MLS/HR: 1000 INJECTION, SOLUTION INTRAVENOUS; SUBCUTANEOUS at 07:27

## 2017-07-23 RX ADMIN — FENOFIBRATE SCH MG: 145 TABLET, FILM COATED ORAL at 08:47

## 2017-07-23 RX ADMIN — RANITIDINE SCH MG: 150 TABLET ORAL at 08:47

## 2017-07-23 RX ADMIN — LEVOTHYROXINE SODIUM SCH MCG: 112 TABLET ORAL at 06:09

## 2017-07-23 NOTE — PROGRESS NOTE
Internal Med Progress Note


Date of Service:


Jul 23, 2017.


Provider Documentation:





SUBJECTIVE:





resting comfortably


ambulating  ok


afebrile


no pain


eating ok


no blood in stools








OBJECTIVE:





Vital Signs-as noted below





Exam:


General-alert and oriented. Not in distress


ENT-normal hearing


Neck-no neck masses


Lungs-cta b/l no wheezing or crackles


Heart-s1 and s2 heard, regular rate and rhythm, no murmurs


Abdomen-soft bowel sounds present non tender no distension 


Extremities- no erythema 


Neuro-alert and oriented


moves extremities





Lab data as noted below.


ASSESSMENT & PLAN:


Acute B/L PE


Acute LLE  DVT


Present with chest pain and hypoxia on admission 


Provoked by recent orthopedic surgery in the LLE


LE venous Doppler showed acute left lower extremity DVT with involvement of the 

popliteal and peroneal veins


CTA chest showed Extensive bilateral pulmonary emboli bilaterally associated 

right heart strain.


On heparin drip


will need to be anticoagulate for at least 3 months


Echo shows   The estimated systolic PAP is 60mmhg.


on iv heparin and Coumadin


inr  2.7


stooped iv heparin and give one dose of lovenox


f/u inr in am.





S/P Left Ankle Surgery


Failed ASA thrombo-embolic prophylaxis


Ortho on board


To continue fracture boot during transportation


continue PT/OT


Orthopedic Discharge Instructions:


-Continue nonweightbearing left lower extremity


-Do not submerge in a bath, it may get wet in a shower but do not scrub directly

, pat dry afterwards and keep covered.


-The boot may be removed to perform PT and range of motion exercises, otherwise 

the boot must be on with sleeping and when upright ambulating/transferring 


-Continue to elevate for swelling control


-Please call [facility] to reschedule an appointment with Dr. Paez for 4 weeks, new xrays will be obtained at that appointment.  [phone]


-If you notice any changes at your incisions, such as increased drainage, 

swelling, redness, or pain, please notify our office accordingly





Hypertension


Stable





DVT px on heparin





CODE STATUS FULL CODE





DISPOSITION


Discharge home in am





Vital Signs:











  Date Time  Temp Pulse Resp B/P (MAP) Pulse Ox O2 Delivery O2 Flow Rate FiO2


 


7/23/17 16:17 36.8 75 18 123/72 (89) 91 Room Air  


 


7/23/17 08:00     92 Room Air  


 


7/23/17 07:14 36.9 70 20 123/73 (90) 92 Room Air  


 


7/23/17 00:15      Room Air  


 


7/23/17 00:13 37.0 71 18 123/73 (90) 91 Room Air  








Lab Results:





Results Past 24 Hours








Test


  7/23/17


06:14 Range/Units


 


 


White Blood Count 5.39 4.8-10.8  K/uL


 


Red Blood Count 4.24 4.2-5.4  M/uL


 


Hemoglobin 12.5 12.0-16.0  g/dL


 


Hematocrit 38.8 37-47  %


 


Mean Corpuscular Volume 91.5   fL


 


Mean Corpuscular Hemoglobin 29.5 25-34  pg


 


Mean Corpuscular Hemoglobin


Concent 32.2


  32-36  g/dl


 


 


Platelet Count 261 130-400  K/uL


 


Mean Platelet Volume 11.0 7.4-10.4  fL


 


Neutrophils (%) (Auto) 65.3  %


 


Lymphocytes (%) (Auto) 21.3  %


 


Monocytes (%) (Auto) 7.8  %


 


Eosinophils (%) (Auto) 4.3  %


 


Basophils (%) (Auto) 0.7  %


 


Neutrophils # (Auto) 3.52 1.4-6.5  K/uL


 


Lymphocytes # (Auto) 1.15 1.2-3.4  K/uL


 


Monocytes # (Auto) 0.42 0.11-0.59  K/uL


 


Eosinophils # (Auto) 0.23 0-0.5  K/uL


 


Basophils # (Auto) 0.04 0-0.2  K/uL


 


RDW Standard Deviation 43.4 36.4-46.3  fL


 


RDW Coefficient of Variation 13.2 11.5-14.5  %


 


Immature Granulocyte % (Auto) 0.6  %


 


Immature Granulocyte # (Auto) 0.03 0.00-0.02  K/uL


 


Prothrombin Time


  29.7


  9.0-12.0


SECONDS


 


Prothromb Time International


Ratio 2.7


  0.9-1.1  


 


 


Activated Partial


Thromboplast Time 60.9


  21.0-31.0


SECONDS


 


Partial Thromboplastin Ratio 2.3

## 2017-07-24 VITALS
HEART RATE: 73 BPM | DIASTOLIC BLOOD PRESSURE: 71 MMHG | TEMPERATURE: 97.7 F | SYSTOLIC BLOOD PRESSURE: 118 MMHG | OXYGEN SATURATION: 92 %

## 2017-07-24 VITALS
SYSTOLIC BLOOD PRESSURE: 118 MMHG | TEMPERATURE: 97.7 F | DIASTOLIC BLOOD PRESSURE: 71 MMHG | HEART RATE: 73 BPM | OXYGEN SATURATION: 92 %

## 2017-07-24 LAB
BASOPHILS # BLD: 0.03 K/UL (ref 0–0.2)
BASOPHILS NFR BLD: 0.6 %
COMPLETE: YES
EOSINOPHIL NFR BLD AUTO: 263 K/UL (ref 130–400)
HCT VFR BLD CALC: 40.1 % (ref 37–47)
IG%: 0.4 %
IMM GRANULOCYTES NFR BLD AUTO: 22.8 %
INR PPP: 3.6 (ref 0.9–1.1)
LYMPHOCYTES # BLD: 1.09 K/UL (ref 1.2–3.4)
MCH RBC QN AUTO: 30.2 PG (ref 25–34)
MCHC RBC AUTO-ENTMCNC: 32.9 G/DL (ref 32–36)
MCV RBC AUTO: 91.8 FL (ref 80–100)
MONOCYTES NFR BLD: 8.8 %
NEUTROPHILS # BLD AUTO: 5.4 %
NEUTROPHILS NFR BLD AUTO: 62 %
PARTIAL THROMBOPLASTIN RATIO: 1.3
PMV BLD AUTO: 10.7 FL (ref 7.4–10.4)
PROTHROMBIN TIME: 41 SECONDS (ref 9–12)
RBC # BLD AUTO: 4.37 M/UL (ref 4.2–5.4)
WBC # BLD AUTO: 4.79 K/UL (ref 4.8–10.8)

## 2017-07-24 RX ADMIN — FENOFIBRATE SCH MG: 145 TABLET, FILM COATED ORAL at 09:31

## 2017-07-24 RX ADMIN — LEVOTHYROXINE SODIUM SCH MCG: 112 TABLET ORAL at 06:08

## 2017-07-24 RX ADMIN — RANITIDINE SCH MG: 150 TABLET ORAL at 09:31

## 2017-07-24 NOTE — DISCHARGE SUMMARY
Discharge Summary


Date of Service


Jul 24, 2017.





Discharge Summary


Admission Date:


Jul 19, 2017 at 02:07


Discharge Date:  Jul 24, 2017


Discharge Disposition:  Home


Principal Diagnosis:


ACUTE PE AND DVT


Secondary Diagnoses/Problems:


(1) Benign hypertension


Status: Chronic  





(2) Chronic Sinusitis Nos


Status: Chronic  





(3) Esophageal Reflux


Status: Chronic  





(4) Gastroesophageal reflux disease


Status: Chronic  





(5) Hyperlipidemia Nec/Nos


Status: Chronic  





(6) Hypertension Nos


Status: Chronic  





(7) Hypothyroidism


Status: Chronic  





Procedures:


CTA CHEST:


1. Extensive bilateral pulmonary emboli bilaterally as above. Straightening of


the intraventricular septum raises the concern for possible associated right


heart strain.


2. Focal groundglass opacity of the left upper lobe may reflect atelectasis,


pneumonitis or small pulmonary infarction.


3. Diffuse fatty infiltration of the liver.


4. Chronic compression deformity of T12.





VENOUS DOPPLER:





 Acute left lower extremity DVT with involvement of the popliteal and


peroneal veins





ECHO:The right ventricle is not well visualized.


  *  The right ventricle appears mildly dialted in limited views.


  *  The right ventricular function is qualitatively normal in limited views.


  *  There is mild tricuspid regurgitation.


  *  The estimated systolic PAP is 60mmhg.


Consultations:


Pulmonary


Ortho





Medication Reconciliation


New Medications:  


Warfarin Sod (Coumadin) 2.5 Mg Tab


2.5 MG PO DAILY, #30 2 Refills





 


Continued Medications:  


Acetaminophen (Tylenol) 500 Mg Tab


1000 MG PO Q6H PRN for Pain, TAB





Atenolol (Tenormin) 50 Mg Tab


50 MG PO QAM





Calcium Carbonate-Vitamin D (Calcium 600+D3) 1 Tab Tab


1 TAB PO BID





Fenofibrate (Tricor) 160 Mg Tab


160 MG PO QAM





Fish Oil (Omega-3) 1 Ea Cap


1 CAP PO BID, 0 Refills





Hydrocodone-Acetaminophen (Lortab 5-325 mg) 1 Tab Tab


1 TAB PO Q4H PRN for Pain





Hydroxyzine Hcl (Atarax) 25 Mg Tab


25 MG PO QAM, TAB





Levothyroxine Sodium (Levothyroxine Sodium) 112 Mcg Tab


112 MCG PO QAM, TAB 5 Refills





Ranitidine (Zantac) 150 Mg Tab


150 MG PO BID





 


Discontinued Medications:  


Aspirin Enteric Coated (Ecotrin Or Generic) 325 Mg Ectab


325 MG PO BID, TAB


"SINCE SURGERY TO PREVENT BLOOD CLOTS".








Admission Information


HPI (per Admitting provider):


Recent confinement under orthopedic service last for left ankle fracture status 

post ORIF.


As per patient and , they were instructed to take full dose aspirin for 

the next few weeks postop to prevent clots.





1 day history of shortness of breath and epigastric discomfort.


No cough symptoms.


Patient denies actual chest pain.


Left lower extremity a little swollen.





Patient brought to the emergency room.


Physical Exam (per Admitting):  


   General Appearance:  + obese, + pertinent finding (slightly anxious, 

receiving a breathing treatment)


   Head:  normocephalic


   Eyes:  normal inspection


   Neck:  + pertinent finding (short)


   Respiratory/Chest:  + decreased breath sounds


   Cardiovascular:  regular rate, rhythm


   Abdomen/GI:  soft


   Extremities/Musculoskelatal:  + pertinent finding (immobilization device on 

the left lower extremity)


   Neurologic/Psych:  alert, + pertinent finding (coherent)


   Skin:  normal color





Hospital Course


Acute B/L PE


Acute LLE  DVT


Present with chest pain and hypoxia on admission 


Provoked by recent orthopedic surgery in the LLE


LE venous Doppler showed acute left lower extremity DVT with involvement of the 

popliteal and peroneal veins


CTA chest showed Extensive bilateral pulmonary emboli bilaterally associated 

right heart strain.


On heparin drip


will need to be anticoagulate for at least 3 months


Echo shows   The estimated systolic PAP is 60mmhg.


on iv heparin and Coumadin


inr  2.7 7/23/17


stooped iv heparin and give one dose of lovenox


inr today 7/24/17 3.6. Holding coumadin tonight and to restart coumadin at 2.5 

mg from tomorrow


needs close followup .





S/P Left Ankle Surgery


Failed ASA thrombo-embolic prophylaxis


Ortho on board


To continue fracture boot during transportation


continue PT/OT


Orthopedic Discharge Instructions:


-Continue nonweightbearing left lower extremity


-Do not submerge in a bath, it may get wet in a shower but do not scrub directly

, pat dry afterwards and keep covered.


-The boot may be removed to perform PT and range of motion exercises, otherwise 

the boot must be on with sleeping and when upright ambulating/transferring 


-Continue to elevate for swelling control


-Please call [facility] to reschedule an appointment with Dr. Paez for 4 weeks, new xrays will be obtained at that appointment.  [phone]


-If you notice any changes at your incisions, such as increased drainage, 

swelling, redness, or pain, please notify our office accordingly





Hypertension


Stable





Discharged home


Total time spent on discharge = 35MINUTES


This includes examination of the patient, discharge planning, medication 

reconciliation, and communication with other providers.





Discharge Instructions


Discharge Instructions


Date of Service


Jul 24, 2017.





Admission


Reason for Admission:  Respiratory Failure, Acute





Discharge


Discharge Diagnosis / Problem:  ACUTE  PE and DVT





Discharge Goals


Goal(s):  Decrease discomfort, Improve function





Activity Recommendations


Activity Limitations:  resume your previous activity





.





Instructions / Follow-Up


Instructions / Follow-Up


FOLLOWUP WITH FAMILY DOCTOR IN ONE WEEK





HOLD COUMADIN TODAY AND RESUME COUMADIN 2.5MG PO DAILY IN EVENING FROM TOMORROW 

7/25/17.





TO CHECK LABS: PT/INR IN 3 DAYS( 7/27/17 AND AGAIN ON 7/31/17) AND FOLLOW 

RESULTS WITH FAMILY DOCTOR FOR FURTHER ADJUSTMENTS IN COUMADIN DOSING. FURTHER 

LAB CHECK AS PER FAMILY DOCTOR OR COUMADIN CLINIC.





DURATION OF COUMADIN AS PER FAMILY DOCTOR.





Current Hospital Diet


Patient's current hospital diet: AHA Diet (Heart Healthy)





Discharge Diet


Recommended Diet:  AHA Diet (Heart Healthy)





Pending Studies


Studies pending at discharge:  no





Medical Emergencies








.


Who to Call and When:





Medical Emergencies:  If at any time you feel your situation is an emergency, 

please call 911 immediately.





.





Non-Emergent Contact


Non-Emergency issues call your:  Primary Care Provider





.


.








"Provider Documentation" section prepared by Keith Geller.








.





Consultant Recommendations


Consultant Recommendations:


Orthopedic Discharge Instructions:





-Continue nonweightbearing left lower extremity


-Do not submerge in a bath, it may get wet in a shower but do not scrub directly

, pat dry afterwards and keep covered.


-The boot may be removed to perform PT and range of motion exercises, otherwise 

the boot must be on with sleeping and when upright ambulating/transferring 


-Continue to elevate for swelling control


-Please call [facility] to reschedule an appointment with Dr. Paez for 4 weeks, new xrays will be obtained at that appointment.  


   [phone]


-If you notice any changes at your incisions, such as increased drainage, 

swelling, redness, or pain, please notify our office accordingly


-Please continue your medicine provided by your providers from [facility] to treat your pulmonary emboli.





VTE Core Measure


Inpt VTE Proph given/why not?:  Warfarin (Coumadin), Other Anticoagulation (IV 

HEPARIN)

## 2017-07-24 NOTE — DISCHARGE INSTRUCTIONS
Discharge Instructions


Date of Service


Jul 24, 2017.





Admission


Reason for Admission:  Respiratory Failure, Acute





Discharge


Discharge Diagnosis / Problem:  ACUTE  PE and DVT





Discharge Goals


Goal(s):  Decrease discomfort, Improve function





Activity Recommendations


Activity Limitations:  resume your previous activity





.





Instructions / Follow-Up


Instructions / Follow-Up


FOLLOWUP WITH FAMILY DOCTOR IN ONE WEEK





HOLD COUMADIN TODAY AND RESUME COUMADIN 2.5MG PO DAILY IN EVENING FROM TOMORROW 

7/25/17.





TO CHECK LABS: PT/INR IN 3 DAYS( 7/27/17 AND AGAIN ON 7/31/17) AND FOLLOW 

RESULTS WITH FAMILY DOCTOR FOR FURTHER ADJUSTMENTS IN COUMADIN DOSING. FURTHER 

LAB CHECK AS PER FAMILY DOCTOR OR COUMADIN CLINIC.





DURATION OF COUMADIN AS PER FAMILY DOCTOR.





Current Hospital Diet


Patient's current hospital diet: AHA Diet (Heart Healthy)





Discharge Diet


Recommended Diet:  AHA Diet (Heart Healthy)





Pending Studies


Studies pending at discharge:  no





Medical Emergencies








.


Who to Call and When:





Medical Emergencies:  If at any time you feel your situation is an emergency, 

please call 911 immediately.





.





Non-Emergent Contact


Non-Emergency issues call your:  Primary Care Provider





.


.








"Provider Documentation" section prepared by Keith Geller.








.





Consultant Recommendations


Consultant Recommendations:


Orthopedic Discharge Instructions:





-Continue nonweightbearing left lower extremity


-Do not submerge in a bath, it may get wet in a shower but do not scrub directly

, pat dry afterwards and keep covered.


-The boot may be removed to perform PT and range of motion exercises, otherwise 

the boot must be on with sleeping and when upright ambulating/transferring 


-Continue to elevate for swelling control


-Please call [facility] to reschedule an appointment with Dr. Paez for 4 weeks, new xrays will be obtained at that appointment.  


   [phone]


-If you notice any changes at your incisions, such as increased drainage, 

swelling, redness, or pain, please notify our office accordingly


-Please continue your medicine provided by your providers from [facility] to treat your pulmonary emboli.





VTE Core Measure


Inpt VTE Proph given/why not?:  Warfarin (Coumadin), Other Anticoagulation (IV 

HEPARIN)

## 2017-07-24 NOTE — PROGRESS NOTE
Internal Med Progress Note


Date of Service:


Jul 24, 2017.


Provider Documentation:





SUBJECTIVE:





resting comfortably


no sob


afebrile


awaiting to be discharged








OBJECTIVE:





Vital Signs-as noted below





Exam:


General-alert and oriented. Not in distress


ENT-normal hearing


Neck-no neck masses


Lungs-cta b/l no wheezing or crackles


Heart-s1 and s2 heard, regular rate and rhythm, no murmurs


Abdomen-soft bowel sounds present non tender no distension 


Extremities- no erythema 


Neuro-alert and oriented


moves extremities





Lab data as noted below.


ASSESSMENT & PLAN:


Acute B/L PE


Acute LLE  DVT


Present with chest pain and hypoxia on admission 


Provoked by recent orthopedic surgery in the LLE


LE venous Doppler showed acute left lower extremity DVT with involvement of the 

popliteal and peroneal veins


CTA chest showed Extensive bilateral pulmonary emboli bilaterally associated 

right heart strain.


On heparin drip


will need to be anticoagulate for at least 3 months


Echo shows   The estimated systolic PAP is 60mmhg.


on iv heparin and Coumadin


inr  2.7 7/23/17


stooped iv heparin and give one dose of lovenox


inr today 7/24/17 3.6. Holding coumadin tonight and to restart coumadin at 2.5 

mg from tomorrow


needs close followup .





S/P Left Ankle Surgery


Failed ASA thrombo-embolic prophylaxis


Ortho on board


To continue fracture boot during transportation


continue PT/OT


Orthopedic Discharge Instructions:


-Continue nonweightbearing left lower extremity


-Do not submerge in a bath, it may get wet in a shower but do not scrub directly

, pat dry afterwards and keep covered.


-The boot may be removed to perform PT and range of motion exercises, otherwise 

the boot must be on with sleeping and when upright ambulating/transferring 


-Continue to elevate for swelling control


-Please call [facility] to reschedule an appointment with Dr. Paez for 4 weeks, new xrays will be obtained at that appointment.  [phone]


-If you notice any changes at your incisions, such as increased drainage, 

swelling, redness, or pain, please notify our office accordingly





Hypertension


Stable





Discharged home


Vital Signs:











  Date Time  Temp Pulse Resp B/P (MAP) Pulse Ox O2 Delivery O2 Flow Rate FiO2


 


7/24/17 12:15 36.5 73 18  92 Room Air  


 


7/24/17 08:10      Room Air  


 


7/24/17 07:08 36.5 73 18 118/71 (87) 92 Room Air  


 


7/23/17 23:59      Room Air  


 


7/23/17 23:36 36.6 65 18 130/80 (97) 92 Room Air  








Lab Results:





Results Past 24 Hours








Test


  7/24/17


06:06 Range/Units


 


 


White Blood Count 4.79 4.8-10.8  K/uL


 


Red Blood Count 4.37 4.2-5.4  M/uL


 


Hemoglobin 13.2 12.0-16.0  g/dL


 


Hematocrit 40.1 37-47  %


 


Mean Corpuscular Volume 91.8   fL


 


Mean Corpuscular Hemoglobin 30.2 25-34  pg


 


Mean Corpuscular Hemoglobin


Concent 32.9


  32-36  g/dl


 


 


Platelet Count 263 130-400  K/uL


 


Mean Platelet Volume 10.7 7.4-10.4  fL


 


Neutrophils (%) (Auto) 62.0  %


 


Lymphocytes (%) (Auto) 22.8  %


 


Monocytes (%) (Auto) 8.8  %


 


Eosinophils (%) (Auto) 5.4  %


 


Basophils (%) (Auto) 0.6  %


 


Neutrophils # (Auto) 2.97 1.4-6.5  K/uL


 


Lymphocytes # (Auto) 1.09 1.2-3.4  K/uL


 


Monocytes # (Auto) 0.42 0.11-0.59  K/uL


 


Eosinophils # (Auto) 0.26 0-0.5  K/uL


 


Basophils # (Auto) 0.03 0-0.2  K/uL


 


RDW Standard Deviation 44.1 36.4-46.3  fL


 


RDW Coefficient of Variation 13.3 11.5-14.5  %


 


Immature Granulocyte % (Auto) 0.4  %


 


Immature Granulocyte # (Auto) 0.02 0.00-0.02  K/uL


 


Prothrombin Time


  41.0


  9.0-12.0


SECONDS


 


Prothromb Time International


Ratio 3.6


  0.9-1.1  


 


 


Activated Partial


Thromboplast Time 34.1


  21.0-31.0


SECONDS


 


Partial Thromboplastin Ratio 1.3

## 2017-07-28 LAB
ANTITHROMBINIII ACTIVITY**: (no result)
B2 GLYCOPROTEIN IGA: (no result)
B2 GLYCOPROTEIN IGG: (no result)
B2 GLYCOPROTEIN IGM: (no result)
LA 3 SCREEN W REFLEX-IMP: (no result)
PROT C PPP-ACNC: (no result) [IU]/ML
PROTEIN S ACT(FUNCT)**1779X: (no result)

## 2017-08-18 ENCOUNTER — HOSPITAL ENCOUNTER (OUTPATIENT)
Dept: HOSPITAL 45 - C.RDSM | Age: 67
Discharge: HOME | End: 2017-08-18
Attending: ORTHOPAEDIC SURGERY
Payer: COMMERCIAL

## 2017-08-18 DIAGNOSIS — M25.572: ICD-10-CM

## 2017-08-18 DIAGNOSIS — Z09: Primary | ICD-10-CM

## 2017-09-14 ENCOUNTER — HOSPITAL ENCOUNTER (OUTPATIENT)
Dept: HOSPITAL 45 - C.RDSM | Age: 67
Discharge: HOME | End: 2017-09-14
Attending: ORTHOPAEDIC SURGERY
Payer: COMMERCIAL

## 2017-09-14 DIAGNOSIS — M25.572: ICD-10-CM

## 2017-09-14 DIAGNOSIS — Z09: Primary | ICD-10-CM

## 2017-10-10 ENCOUNTER — HOSPITAL ENCOUNTER (OUTPATIENT)
Dept: HOSPITAL 45 - C.RDSM | Age: 67
Discharge: HOME | End: 2017-10-10
Attending: ORTHOPAEDIC SURGERY
Payer: COMMERCIAL

## 2017-10-10 DIAGNOSIS — Z09: Primary | ICD-10-CM

## 2017-10-20 ENCOUNTER — HOSPITAL ENCOUNTER (OUTPATIENT)
Dept: HOSPITAL 45 - X.SURG | Age: 67
Discharge: HOME | End: 2017-10-20
Attending: ORTHOPAEDIC SURGERY
Payer: COMMERCIAL

## 2017-10-20 VITALS — OXYGEN SATURATION: 93 % | DIASTOLIC BLOOD PRESSURE: 73 MMHG | SYSTOLIC BLOOD PRESSURE: 131 MMHG | HEART RATE: 68 BPM

## 2017-10-20 VITALS
BODY MASS INDEX: 36.17 KG/M2 | WEIGHT: 214.44 LBS | HEIGHT: 64.49 IN | BODY MASS INDEX: 36.17 KG/M2 | HEIGHT: 64.49 IN | WEIGHT: 214.44 LBS

## 2017-10-20 DIAGNOSIS — E66.9: ICD-10-CM

## 2017-10-20 DIAGNOSIS — E78.00: ICD-10-CM

## 2017-10-20 DIAGNOSIS — K44.9: ICD-10-CM

## 2017-10-20 DIAGNOSIS — Z90.49: ICD-10-CM

## 2017-10-20 DIAGNOSIS — Z86.711: ICD-10-CM

## 2017-10-20 DIAGNOSIS — T84.84XA: Primary | ICD-10-CM

## 2017-10-20 DIAGNOSIS — Z90.710: ICD-10-CM
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
## 2017-10-20 DIAGNOSIS — Z79.82: ICD-10-CM

## 2017-10-20 DIAGNOSIS — E03.9: ICD-10-CM

## 2017-10-20 DIAGNOSIS — I10: ICD-10-CM

## 2017-10-20 DIAGNOSIS — Z90.89: ICD-10-CM

## 2017-10-20 DIAGNOSIS — K21.9: ICD-10-CM

## 2017-10-20 RX ADMIN — FENTANYL CITRATE PRN MCG: 50 INJECTION, SOLUTION INTRAMUSCULAR; INTRAVENOUS at 09:13

## 2017-10-20 RX ADMIN — FENTANYL CITRATE PRN MCG: 50 INJECTION, SOLUTION INTRAMUSCULAR; INTRAVENOUS at 09:20

## 2017-10-20 NOTE — ANESTHESIOLOGY PROGRESS NOTE
Anesthesia Post Op Note


Date & Time


Oct 20, 2017 at 09:58





Vital Signs


Pain Intensity:  4





Vital Signs Past 12 Hours








  Date Time  Temp Pulse Resp B/P (MAP) Pulse Ox O2 Delivery O2 Flow Rate FiO2


 


10/20/17 09:43  75 16 146/72 (96) 93 Room Air  


 


10/20/17 09:36  75 22     


 


10/20/17 09:36  75 22 146/84 (111) 96   


 


10/20/17 09:31  78 19  99   


 


10/20/17 09:31  77 19     


 


10/20/17 09:30    146/75 (107)    


 


10/20/17 09:26  78 12 143/71 (98) 97   


 


10/20/17 09:26  78 12     


 


10/20/17 09:21  72 19  97   


 


10/20/17 09:21  72 19     


 


10/20/17 09:20    139/84 (110)    


 


10/20/17 09:16  77 16     


 


10/20/17 09:16  78 16 137/79 (110) 98   


 


10/20/17 09:11  75 17     


 


10/20/17 09:11  75 17  98   


 


10/20/17 09:10    137/83 (106)    


 


10/20/17 09:06  81 17     


 


10/20/17 09:06  80 17  100   


 


10/20/17 09:05    141/75 (91)    


 


10/20/17 09:01  74 20     


 


10/20/17 09:01  74 20  97   


 


10/20/17 09:00    138/75 (83)    


 


10/20/17 08:56  75 15     


 


10/20/17 08:56  74 15  97   


 


10/20/17 08:55    130/67 (104)    


 


10/20/17 08:51  77 15     


 


10/20/17 08:51  78 15  97   


 


10/20/17 08:50    132/72 (82)    


 


10/20/17 08:47    128/76 (83)    


 


10/20/17 08:45 36 78 12 128/76 95 Diffusion Mask 5 


 


10/20/17 06:31 36.8 74 16 157/94 (115) 95 Room Air  











Notes


Mental Status:  alert / awake / arousable, participated in evaluation


Pt Amnestic to Procedure:  Yes


Nausea / Vomiting:  adequately controlled


Pain:  adequately controlled


Airway Patency, RR, SpO2:  stable & adequate


BP & HR:  stable & adequate


Hydration State:  stable & adequate


Anesthetic Complications:  no major complications apparent

## 2017-10-20 NOTE — DISCHARGE INSTRUCTIONS-SURGCTR
Discharge Instructions


Date of Service


Oct 20, 2017.





Visit


Reason for Visit:  Left Ankle Retained Hardware





Discharge


Discharge Diagnosis / Problem:  S/P Left ankle hardware removal





Discharge Goals


Goal(s):  Decrease discomfort, Improve function, Increase independence





Activity Recommendations


Activity Limitations:  as noted below


Lifting Limitations:  until after follow-up appointment


Exercise/Sports Limitations:  until after follow-up appointment


Shower/Bathe:  keep incision dry


Driving or Machine Use:  when cleared by Dr. Paez


Weightbearing Status:  Left toe touch





Anesthesia


.





Post Anesthesia Instructions:





If you have had General Anesthesia or IV Sedation:





*  Do not drive today.


*  Resume driving when surgeon permits.


*  Do not make important decisions or sign legal documents today.


*  Call surgeon for:





   1.  Temperature elevations greater than 101 degrees F.


   2.  Uncontrollable pain.


   3.  Excessive bleeding.


   4.  Persistent nausea and vomiting.


   5.  Medication intolerance (nausea, vomiting or rash).





*  For nausea and vomiting use only clear liquids such as: tea, soda, bouillon 

until nausea subsides, then gradually increase diet as tolerated.





*  If you have any concerns or questions, call your surgeon's office.  If 

physician is unavailable and it is an emergency, call 911 or go to the nearest 

emergency room.





.





Instructions / Follow-Up


Instructions / Follow-Up





DIET:





*  Resume previous diet.








MEDICATIONS:





*  Please take your prescriptions as instructed at your pre-op appointment and/

or see


   medication discharge instructions listed above.





*  If concerns develop, call your physician's office at [phone].








SPECIAL CARE INSTRUCTIONS:





*  Ice/Elevate as instructed.





*  Keep dressing clean, dry, intact.





*  Your surgical extremity may be discolored due to prepping agents used on the 

skin.  


   A bluish-green tint is a normal variant and should not cause alarm.





Call your doctor at [phone] if:





*  Temperature above 101 degrees





*  Pain not relieved by pain medicine ordered





*  There is increased drainage or redness from any incision





*  You have any unanswered questions, problems or concerns.








FOLLOW UP VISIT:





*  If not already scheduled, please call the office at [phone] to 

schedule a follow-up


   appointment.





Diet Recommendations


Home Diet:  resume previous diet





Procedures


Procedures Performed:  


Left Ankle Retained Hardware Removal





Pending Studies


Studies pending at discharge:  no





Medical Emergencies








.


Who to Call and When:





Medical Emergencies:  If at any time you feel your situation is an emergency, 

please call 911 immediately.





.





Non-Emergent Contact


Non-Emergency issues call your:  Primary Care Provider





.


.








"Provider Documentation" section prepared by Gonzalez Stone.








.





[place] Drug Monitoring Program


Search Results:  no issues identified

## 2017-10-20 NOTE — [facility] POST OPERATIVE BRIEF NOTE
Immediate Operative Summary


Operative Date


Oct 20, 2017.





Pre-Operative Diagnosis





Left Ankle Retained Hardware





Post-Operative Diagnosis





Same





Procedure(s) Performed





Left Ankle Retained Hardware Removal





Surgeon


Dr. TACHO Paez





Assistant Surgeon(s)


Dr. BRADEN Maldonado





Estimated Blood Loss


10





Findings


same





Specimens





One plate and 5 screws removed.  Do not send to lab per Dr. Paez.





Drains


none





Anesthesia


general





Complication(s)


None





Disposition


Recovery Room / PACU

## 2017-12-14 ENCOUNTER — HOSPITAL ENCOUNTER (OUTPATIENT)
Dept: HOSPITAL 45 - C.RDSM | Age: 67
Discharge: HOME | End: 2017-12-14
Attending: ORTHOPAEDIC SURGERY
Payer: COMMERCIAL

## 2017-12-14 DIAGNOSIS — Z09: Primary | ICD-10-CM

## 2017-12-14 DIAGNOSIS — M25.562: ICD-10-CM

## 2018-01-17 ENCOUNTER — HOSPITAL ENCOUNTER (OUTPATIENT)
Dept: HOSPITAL 45 - C.MRI | Age: 68
Discharge: HOME | End: 2018-01-17
Attending: ORTHOPAEDIC SURGERY
Payer: COMMERCIAL

## 2018-01-17 DIAGNOSIS — M25.462: Primary | ICD-10-CM

## 2018-01-17 DIAGNOSIS — M67.50: ICD-10-CM

## 2018-01-17 NOTE — DIAGNOSTIC IMAGING REPORT
L LOWER EXT JOINT WITHOUT



CLINICAL HISTORY: 67 years-old Female with L KNEE PAIN.  Acute left knee pain

status post injury. Pain is most pronounced medially.



COMPARISON: Bilateral knee radiographs 3/18/2016



TECHNIQUE: Multiplanar, multisequence MRI of the left knee was performed without

intravenous contrast.



FINDINGS: 



MENISCI: Mild irregularity of the posterior horn lateral meniscus without

discrete tear identified. Mild intrameniscal degeneration is noted within the

posterior horn medial meniscus without discrete tear. No parameniscal cyst.



CRUCIATE LIGAMENTS: The anterior and posterior cruciate ligaments are normal in

signal, morphology and course.



COLLATERAL LIGAMENTS: The popliteus tendon, biceps femoris tendon, fibular

collateral ligament and iliotibial band are intact.  The superficial and deep

components of the medial collateral ligament are intact.



EXTENSOR MECHANISM: The quadriceps and patellar tendons are intact. The medial

and lateral patellar retinacula are intact.



KNEE JOINT: Mild tricompartmental joint space narrowing with marginal spurring

and mostly low-grade chondromalacia. Areas of moderate grade chondral loss are

noted involving the medial femoral condyle. Only minimal chondromalacia involves

the patellofemoral joint. Trace joint effusion. No intra-articular loose body

identified.



BONE MARROW: There is moderate bone marrow edema noted involving the medial

femoral condyle, greatest along the anteromedial margin. Mild associated soft

tissue edema with edema interposed between the condyle and MCL. No discrete

fracture identified. No insufficiency fracture. Bone marrow is otherwise within

normal limits.



SOFT TISSUES: There is mild intramuscular edema and mild surrounding soft tissue

edema involving the medial head gastrocnemius tendon as seen on image 8 series

4. Trace fluid is noted within the deep pretibial bursa.



IMPRESSION: 

1. Mild intramuscular and mild surrounding soft tissue edema involving the

medial head gastrocnemius attachment site suggests grade 1 muscle strain.

2. Moderate bone marrow edema of the medial femoral condyle without fracture

suggests bone marrow contusion.

3. Mild tricompartmental degenerative changes, most pronounced within the medial

compartment.

4. No discrete meniscal tear or acute ligamentous injury identified.

5. Trace joint effusion with minimal fluid seen within the deep pretibial bursa.







The above report was generated using voice recognition software. It may contain

grammatical, syntax or spelling errors.









Electronically signed by:  Donald Garcia M.D.

1/17/2018 1:12 PM



Dictated Date/Time:  1/17/2018 1:00 PM

## 2018-03-23 ENCOUNTER — HOSPITAL ENCOUNTER (OUTPATIENT)
Dept: HOSPITAL 45 - C.MAMM | Age: 68
Discharge: HOME | End: 2018-03-23
Attending: PHYSICIAN ASSISTANT
Payer: COMMERCIAL

## 2018-03-23 DIAGNOSIS — Z12.31: Primary | ICD-10-CM

## 2018-03-26 NOTE — MAMMOGRAPHY REPORT
BILATERAL DIGITAL SCREENING MAMMOGRAM TOMOSYNTHESIS WITH CAD: 3/23/2018



TECHNIQUE:  Breast tomosynthesis in addition to standard 2D mammography was performed. Current study 
was also evaluated with a Computer Aided Detection (CAD) system.  



COMPARISON: Comparison is made to exams dated:  12/29/2016 mammogram, 12/23/2015 mammogram, 12/19/201
4 mammogram, 12/18/2013 mammogram, 12/12/2012 mammogram, and 12/8/2010 mammogram - [facility].   



BREAST COMPOSITION:  There are scattered areas of fibroglandular density in both breasts.  



FINDINGS:  No suspicious masses, calcifications, or areas of architectural distortion are noted in ei
ther breast. There has been no significant interval change compared to prior exams.  



IMPRESSION:  ACR BI-RADS CATEGORY 1: NEGATIVE

There is no mammographic evidence of malignancy. A 1 year screening mammogram is recommended.  The pa
tient will receive written notification of the results.  





Approximately 10% of breast cancers are not detected with mammography. A negative mammographic report
 should not delay biopsy if a clinically suggestive mass is present.



Parris Braun M.D.          

ah/:3/23/2018 14:44:40  



Imaging Technologist: Dejah HOLMAN(TERESA)(BG), [facility]

letter sent: Normal 1/2  

BI-RADS Code: ACR BI-RADS Category 1: Negative